# Patient Record
Sex: FEMALE | Race: WHITE | Employment: OTHER | URBAN - METROPOLITAN AREA
[De-identification: names, ages, dates, MRNs, and addresses within clinical notes are randomized per-mention and may not be internally consistent; named-entity substitution may affect disease eponyms.]

---

## 2017-05-18 ENCOUNTER — OFFICE VISIT (OUTPATIENT)
Dept: CARDIOLOGY CLINIC | Age: 62
End: 2017-05-18

## 2017-05-18 ENCOUNTER — TELEPHONE (OUTPATIENT)
Dept: CARDIOLOGY CLINIC | Age: 62
End: 2017-05-18

## 2017-05-18 ENCOUNTER — CLINICAL SUPPORT (OUTPATIENT)
Dept: CARDIOLOGY CLINIC | Age: 62
End: 2017-05-18

## 2017-05-18 VITALS
HEART RATE: 93 BPM | SYSTOLIC BLOOD PRESSURE: 118 MMHG | BODY MASS INDEX: 17.23 KG/M2 | RESPIRATION RATE: 12 BRPM | HEIGHT: 66 IN | WEIGHT: 107.2 LBS | OXYGEN SATURATION: 97 % | DIASTOLIC BLOOD PRESSURE: 82 MMHG

## 2017-05-18 DIAGNOSIS — R09.89 RIGHT CAROTID BRUIT: ICD-10-CM

## 2017-05-18 DIAGNOSIS — R93.1 AGATSTON CORONARY ARTERY CALCIUM SCORE GREATER THAN 400: Primary | ICD-10-CM

## 2017-05-18 DIAGNOSIS — E78.2 MIXED HYPERLIPIDEMIA: ICD-10-CM

## 2017-05-18 DIAGNOSIS — Z72.0 TOBACCO ABUSE: ICD-10-CM

## 2017-05-18 NOTE — MR AVS SNAPSHOT
Visit Information Date & Time Provider Department Dept. Phone Encounter #  
 5/18/2017 10:20 AM Elkin Baez MD [facility] [phone] [id number] Follow-up Instructions Return in about 1 year (around 5/18/2018). Upcoming Health Maintenance Date Due Hepatitis C Screening 1955 Pneumococcal 19-64 Medium Risk (1 of 1 - PPSV23) 2/6/1973 DTaP/Tdap/Td series (1 - Tdap) 2/6/1975 PAP AKA CERVICAL CYTOLOGY 2/6/1975 FOBT Q 1 YEAR AGE 50-75 2/6/2004 ZOSTER VACCINE AGE 60> 2/6/2014 INFLUENZA AGE 9 TO ADULT 8/1/2017 BREAST CANCER SCRN MAMMOGRAM 6/16/2018 Allergies as of 5/18/2017  Review Complete On: 5/18/2017 By: Elkin Baez MD  
  
 Severity Noted Reaction Type Reactions Augmentin [Amoxicillin-pot Clavulanate]  06/15/2015    Nausea and Vomiting Current Immunizations  Never Reviewed No immunizations on file. Not reviewed this visit You Were Diagnosed With   
  
 Codes Comments Mixed hyperlipidemia    -  Primary ICD-10-CM: A83.5 ICD-9-CM: 272.2 Tobacco abuse     ICD-10-CM: Z72.0 ICD-9-CM: 305.1 Agatston coronary artery calcium score greater than 400     ICD-10-CM: R93.1 ICD-9-CM: 793.2 Vitals BP Pulse Resp Height(growth percentile) Weight(growth percentile) SpO2  
 118/82 93 12 5' 6\" (1.676 m) 107 lb 3.2 oz (48.6 kg) 97% BMI Smoking Status 17.3 kg/m2 Current Every Day Smoker Vitals History BMI and BSA Data Body Mass Index Body Surface Area  
 17.3 kg/m 2 1.5 m 2 Preferred Pharmacy Pharmacy Name Phone [address] [facility], [address] [place] [address] Your Updated Medication List  
  
   
This list is accurate as of: 5/18/17 11:13 AM.  Always use your most recent med list. ALLEGRA 60 mg tablet Generic drug:  fexofenadine Take  by mouth as needed. aspirin delayed-release 81 mg tablet Take  by mouth daily. LIPITOR 20 mg tablet Generic drug:  atorvastatin Take  by mouth daily. PREMARIN 0.625 mg tablet Generic drug:  conjugated estrogens Take  by mouth. Follow-up Instructions Return in about 1 year (around 5/18/2018). To-Do List   
 06/19/2017 8:00 AM  
  Appointment with [facility] at [address] [place] [address] ([phone]) Shower or bathe using soap and water. Do not use deodorant, powder, perfumes, or lotion the day of your exam.  If your prior mammograms were not performed at [facility] please bring films with you or forward prior images 2 days before your procedure. Check in at registration 15min before your appointment time unless you were instructed to do otherwise. A script is not necessary, but if you have one, please bring it on the day of the mammogram or have it faxed to the department. [facility] [phone] [facility]  [phone] [facility]  [phone] [facility] [phone] [address] [phone] [facility] [place] [facility]! Dear Comfort Valentine: Thank you for requesting a MYOMO account. Our records indicate that you have previously registered for a MYOMO account but its currently inactive. Please call our MYOMO support line at [phone]. Additional Information If you have questions, please visit the Frequently Asked Questions section of the MYOMO website at [email or web address] Oncimmune/Hyper Urban Level User Swedent/. Remember, MYOMO is NOT to be used for urgent needs. For medical emergencies, dial 911. Now available from your iPhone and Android! Please provide this summary of care documentation to your next provider. Your primary care clinician is listed as Nando Jolly. If you have any questions after today's visit, please call [phone].

## 2017-05-18 NOTE — PROGRESS NOTES
HISTORY OF PRESENT ILLNESS  Tony Denney is a 60 y.o. female     SUMMARY:   Problem List  Date Reviewed: 5/18/2017          Codes Class Noted    Hyperlipidemia ICD-10-CM: E78.5  ICD-9-CM: 272.4  6/15/2015        Tobacco abuse ICD-10-CM: Z72.0  ICD-9-CM: 305.1  6/15/2015        Agatston coronary artery calcium score greater than 400 ICD-10-CM: R93.1  ICD-9-CM: 793.2  6/15/2015    Overview Addendum 6/17/2015  3:15 PM by Stanford Snellen, MD     10/14 normal exercise cardiolyte stress test at [facility], lvef 62%  9/14 calcium score 471, 98th percentile,  intercardia                   Current Outpatient Prescriptions on File Prior to Visit   Medication Sig    conjugated estrogens (PREMARIN) 0.625 mg tablet Take  by mouth.  atorvastatin (LIPITOR) 20 mg tablet Take  by mouth daily.  aspirin delayed-release 81 mg tablet Take  by mouth daily.  fexofenadine (ALLEGRA) 60 mg tablet Take  by mouth as needed. No current facility-administered medications on file prior to visit. CARDIOLOGY STUDIES TO DATE:  6/14 calcium score 471  9/14 normal nuclear stress test per patient    5/17 normal stress echo      Chief Complaint   Patient presents with    Abnormal Cardiac Test     HPI :  Ms. Aye Whitfield continues to do well and remains asymptomatic from a cardiac standpoint. Unfortunately, she is still smoking. She said that Dr. Sweta Cook got some recent blood work on her and her cholesterol was good. She is very active working in her yard and exercising with no difficulty. CARDIAC ROS:   negative for chest pain, dyspnea, palpitations, syncope, orthopnea, paroxysmal nocturnal dyspnea, exertional chest pressure/discomfort, claudication, lower extremity edema    Family History   Problem Relation Age of Onset    Cancer Mother     Heart Disease Father      cabg late 63's       No past medical history on file.     GENERAL ROS:  A comprehensive review of systems was negative except for that written in the HPI.    Visit Vitals    /82    Pulse 93    Resp 12    Ht 5' 6\" (1.676 m)    Wt 107 lb 3.2 oz (48.6 kg)    SpO2 97%    BMI 17.3 kg/m2       Wt Readings from Last 3 Encounters:   05/18/17 107 lb 3.2 oz (48.6 kg)   05/19/16 106 lb 3.2 oz (48.2 kg)   06/15/15 107 lb 6.4 oz (48.7 kg)            BP Readings from Last 3 Encounters:   05/18/17 118/82   05/19/16 140/88   06/15/15 132/80       PHYSICAL EXAM  General appearance: alert, cooperative, no distress, appears stated age  Neck: supple, symmetrical, trachea midline, no adenopathy, right carotid bruit and no JVD  Lungs: clear to auscultation bilaterally  Heart: regular rate and rhythm, S1, S2 normal, no murmur, click, rub or gallop  Extremities: extremities normal, atraumatic, no cyanosis or edema      ASSESSMENT  Ms. Padmini Cabrera stress echo was normal today. We went over all of those results. She has a newly noted right carotid bruit and I explained the implications of that to her. We are going to get carotid Dopplers and track down her most recent lipid profile. current treatment plan is effective, no change in therapy  lab results and schedule of future lab studies reviewed with patient  reviewed diet, exercise and weight control  very strongly urged to quit smoking to reduce cardiovascular risk    Encounter Diagnoses   Name Primary?  Agatston coronary artery calcium score greater than 400 Yes    Mixed hyperlipidemia     Tobacco abuse     Right carotid bruit      No orders of the defined types were placed in this encounter. Follow-up Disposition:  Return in about 1 year (around 5/18/2018).     Elkin Baez MD  5/18/2017

## 2017-05-23 ENCOUNTER — TELEPHONE (OUTPATIENT)
Dept: CARDIOLOGY CLINIC | Age: 62
End: 2017-05-23

## 2017-05-23 ENCOUNTER — CLINICAL SUPPORT (OUTPATIENT)
Dept: CARDIOLOGY CLINIC | Age: 62
End: 2017-05-23

## 2017-05-23 DIAGNOSIS — I65.23 BILATERAL CAROTID ARTERY STENOSIS: ICD-10-CM

## 2017-05-23 DIAGNOSIS — R09.89 RIGHT CAROTID BRUIT: Primary | ICD-10-CM

## 2017-05-23 NOTE — PROGRESS NOTES
Mild to mod blockages on right, mod blockages on left.  May Love for now but need to follow and would repeat in 1yr

## 2017-05-23 NOTE — TELEPHONE ENCOUNTER
Called patient. Verified patient's identity with two identifiers. Notified patient of results and Dr. Kari Prieto message. Discussed results further. Patient requested to have carotids repeated sooner than 1 year. I told her it may be fine to repeat in 6 months. Scheduled carotids and f/u for November. Patient verbalizes understanding and denies further questions or concerns.

## 2017-05-23 NOTE — TELEPHONE ENCOUNTER
Bridger Tate MD   You 9 minutes ago (12:52 PM)                 Should be ok to repeat in 6mos (Routing comment)

## 2017-05-23 NOTE — TELEPHONE ENCOUNTER
----- Message from Onesimo Mendez MD sent at 5/23/2017 10:37 AM EDT -----  Mild to mod blockages on right, mod blockages on left.  [id number] for now but need to follow and would repeat in 1yr

## 2017-05-23 NOTE — PROCEDURES
[facility]  *** FINAL REPORT ***    Name: Lui Vanessa  MRN: [id number]      Outpatient  : 1955  HIS Order #: [id number]  [id number] [facility] [address] Visit #: [id number]  Date: 23 May 2017    TYPE OF TEST: Cerebrovascular Duplex    REASON FOR TEST  Carotid bruit (right hemisphere)    Right Carotid:-             Proximal               Mid                 Distal  cm/s  Systolic  Diastolic  Systolic  Diastolic  Systolic  Diastolic  CCA:     71.0      16.0                            89.0      31.0  Bulb:    87.0      28.0  ECA:     99.0      18.0  ICA:     84.0      26.0       91.0      34.0      100.0      38.0  ICA/CCA:  0.9       0.8    ICA Stenosis: Unknown    Right Vertebral:-  Finding: Antegrade  Sys:       43.0  Aicha:       10.0    Right Subclavian:    Left Carotid:-            Proximal                Mid                 Distal  cm/s  Systolic  Diastolic  Systolic  Diastolic  Systolic  Diastolic  CCA:     89.2      20.0                            65.0      21.0  Bulb:   120.0      44.0  ECA:    106.0      20.0  ICA:    115.0      38.0      155.0      59.0      128.0      40.0  ICA/CCA:  1.8       1.8    ICA Stenosis: Unknown    Left Vertebral:-  Finding: Antegrade  Sys:       55.0  Aicha:       21.0    Left Subclavian:    INTERPRETATION/FINDINGS  PROCEDURE:  Evaluation of the extracranial cerebrovascular arteries  with ultrasound (B-mode imaging, pulsed Doppler, color Doppler). Includes the common carotid, internal carotid, external carotid, and  vertebral arteries. FINDINGS:    Right:  Mild heterogeneous plaque is exhibited within the  bifurcation and the proximal internal carotid artery. Color flow  imaging reveals a mild filling defect. Peak ICA velocities are normal   at 100 cm/s. LeftL  Mild, partially calcified plaque is visualized at the level  of the bifurcation extending into the proximal internal carotid  artery.   Additional plaque is seen within the mid segment of the  internal carotid artery. Filling defects are noted at the site of the   plaque formation. Peak systolic velocities are mildly elevated at 155   cm/s. IMPRESSION: Findings are consistent with 10-49% stenosis of the right  internal carotid and 50-79% stenosis of the left internal carotid. Vertebrals are patent with antegrade flow. ADDITIONAL COMMENTS    I have personally reviewed the data relevant to the interpretation of  this  study. TECHNOLOGIST: DANIEL Flaherty  Signed: 05/23/2017 08:42 AM    PHYSICIAN: Lesley Solano.  Jacy Jones MD  Signed: 05/23/2017 09:11 AM

## 2017-06-19 ENCOUNTER — HOSPITAL ENCOUNTER (OUTPATIENT)
Dept: MAMMOGRAPHY | Age: 62
Discharge: HOME | End: 2017-06-19
Attending: FAMILY MEDICINE
Payer: COMMERCIAL

## 2017-06-19 DIAGNOSIS — Z12.31 VISIT FOR SCREENING MAMMOGRAM: ICD-10-CM

## 2017-06-19 PROCEDURE — 77067 SCR MAMMO BI INCL CAD: CPT

## 2017-11-16 ENCOUNTER — TELEPHONE (OUTPATIENT)
Dept: CARDIOLOGY CLINIC | Age: 62
End: 2017-11-16

## 2017-11-16 ENCOUNTER — CLINICAL SUPPORT (OUTPATIENT)
Dept: CARDIOLOGY CLINIC | Age: 62
End: 2017-11-16

## 2017-11-16 DIAGNOSIS — R09.89 RIGHT CAROTID BRUIT: Primary | ICD-10-CM

## 2017-11-16 DIAGNOSIS — I65.23 BILATERAL CAROTID ARTERY STENOSIS: ICD-10-CM

## 2017-11-16 NOTE — PROCEDURES
[facility]  *** FINAL REPORT ***    Name: Francisca Levy  MRN: [id number]      Outpatient  : 1955  HIS Order #: [id number]  [id number] [facility] Visit #: [id number]  Date: 2017    TYPE OF TEST: Cerebrovascular Duplex    REASON FOR TEST  Carotid bruit (right hemisphere), Known carotid stenosis    Right Carotid:-             Proximal               Mid                 Distal  cm/s  Systolic  Diastolic  Systolic  Diastolic  Systolic  Diastolic  CCA:     69.6      21.0                            87.0      32.0  Bulb:  ECA:    158.0      30.0  ICA:     82.0      24.0       99.0      36.0       82.0      31.0  ICA/CCA:  0.9       0.8    ICA Stenosis: <50%    Right Vertebral:-  Finding: Antegrade  Sys:       42.0  Aicha:    Right Subclavian:    Left Carotid:-            Proximal                Mid                 Distal  cm/s  Systolic  Diastolic  Systolic  Diastolic  Systolic  Diastolic  CCA:    489.9      32.0                            86.0      27.0  Bulb:  ECA:    104.0      29.0  ICA:    139.0      46.0      101.0      36.0       86.0      37.0  ICA/CCA:  1.6       1.7    ICA Stenosis: 50-69%    Left Vertebral:-  Finding: Antegrade  Sys:       54.0  Aicha:    Left Subclavian:    INTERPRETATION/FINDINGS  PROCEDURE:  Evaluation of the extracranial cerebrovascular arteries  with ultrasound (B-mode imaging, pulsed Doppler, color Doppler). Includes the common carotid, internal carotid, external carotid, and  vertebral arteries. FINDINGS:  Right side - Intimal thickening is noted within the distal common  carotid artery. Mild heterogeneous plaque is present within the  proximal internal and external carotid arteries. Mild color flow  filling defects are noted and peak velocities remain within normal  limits. Left side - Moderate, partially calcified plaque extends through the  proximal internal carotid artery. Color flow exhibits mild to  moderate filling defects.   Peak ICA velocities are elevated. IMPRESSION:  1. 10-49% stenosis in the right internal carotid rtery. 2. 50-79% stenosis in the left internal carotid artery. 3. Vertebral artery flow is antegrade bilaterally. 4. No significant change when compared to the previous exam of  5/23/2017. ADDITIONAL COMMENTS    I have personally reviewed the data relevant to the interpretation of  this  study. TECHNOLOGIST: Bonnie Gifford RVT, RDMS, RDCS  Signed: 11/16/2017 09:50 AM    PHYSICIAN: Larisa Pisano.  Jena Salguero MD  Signed: 11/21/2017 01:08 PM

## 2017-11-16 NOTE — TELEPHONE ENCOUNTER
When I called patient in May with carotid results, she had stated she wanted to have the testing sooner than 1 year f/u so I had rescheduled appointments for carotids and follow up for today (6 months). Patient had carotid dopplers today. Patient states she did not expect office visit and has her dog in her car. Dr. Iris Gonzalez said she did not need to be seen by him today. I told patient I would call her with results and will make future follow up appointment. Patient verbalized understanding and denies further questions or concerns.

## 2017-11-17 NOTE — TELEPHONE ENCOUNTER
Heather Bueno MD   You 5 minutes ago (1:17 PM)                 Moderate left and mild to mod right sided blockage. No change from before. Repeat 1yr (Routing comment)           Called patient. Left message on voicemail. Will give results.

## 2018-05-14 ENCOUNTER — OFFICE VISIT (OUTPATIENT)
Dept: CARDIOLOGY CLINIC | Age: 63
End: 2018-05-14

## 2018-05-14 VITALS
BODY MASS INDEX: 17 KG/M2 | HEART RATE: 85 BPM | DIASTOLIC BLOOD PRESSURE: 86 MMHG | OXYGEN SATURATION: 98 % | WEIGHT: 105.8 LBS | SYSTOLIC BLOOD PRESSURE: 138 MMHG | HEIGHT: 66 IN

## 2018-05-14 DIAGNOSIS — I65.23 BILATERAL CAROTID ARTERY STENOSIS: ICD-10-CM

## 2018-05-14 DIAGNOSIS — R93.1 AGATSTON CORONARY ARTERY CALCIUM SCORE GREATER THAN 400: Primary | ICD-10-CM

## 2018-05-14 DIAGNOSIS — E78.2 MIXED HYPERLIPIDEMIA: ICD-10-CM

## 2018-05-14 NOTE — PROGRESS NOTES
HISTORY OF PRESENT ILLNESS  Danielle Thomason is a 58 y.o. female     SUMMARY:   Problem List  Date Reviewed: 5/14/2018          Codes Class Noted    Hyperlipidemia ICD-10-CM: E78.5  ICD-9-CM: 272.4  6/15/2015        Tobacco abuse ICD-10-CM: Z72.0  ICD-9-CM: 305.1  6/15/2015        [facility] coronary artery calcium score greater than 400 ICD-10-CM: R93.1  ICD-9-CM: 793.2  6/15/2015    Overview Addendum 6/17/2015  3:15 PM by Julia Barber MD     10/14 normal exercise cardiolyte stress test at [facility], lvef 62%  9/14 calcium score 471, 98th percentile,  intercardia                   Current Outpatient Prescriptions on File Prior to Visit   Medication Sig    conjugated estrogens (PREMARIN) 0.625 mg tablet Take  by mouth.  atorvastatin (LIPITOR) 20 mg tablet Take  by mouth daily.  aspirin delayed-release 81 mg tablet Take  by mouth daily.  fexofenadine (ALLEGRA) 60 mg tablet Take  by mouth as needed. No current facility-administered medications on file prior to visit. CARDIOLOGY STUDIES TO DATE:  6/14 calcium score 471  9/14 normal nuclear stress test per patient    5/17 normal stress echo  11/17 50-79% left  and 10-49% right carotid stenoses    Chief Complaint   Patient presents with    Abnormal Cardiac Test     HPI :  Ms. Nik Sin is doing great. They have been back and forth to [place] a few times and they have been working on their house here in [place], so she is kind of worn out with all of that. Dr. Martha Anton is following her lipids. Unfortunately, she is still smoking. CARDIAC ROS:   negative for chest pain, dyspnea, palpitations, syncope, orthopnea, paroxysmal nocturnal dyspnea, exertional chest pressure/discomfort, claudication, lower extremity edema    Family History   Problem Relation Age of Onset    Cancer Mother     Heart Disease Father      cabg late 63's       No past medical history on file.     GENERAL ROS:  A comprehensive review of systems was negative except for that written in the HPI. Visit Vitals    /86 (BP 1 Location: Left arm, BP Patient Position: Sitting)    Pulse 85    Ht 5' 6\" (1.676 m)    Wt 105 lb 12.8 oz (48 kg)    SpO2 98%    BMI 17.08 kg/m2       Wt Readings from Last 3 Encounters:   05/14/18 105 lb 12.8 oz (48 kg)   05/18/17 107 lb 3.2 oz (48.6 kg)   05/19/16 106 lb 3.2 oz (48.2 kg)            BP Readings from Last 3 Encounters:   05/14/18 138/86   05/18/17 118/82   05/19/16 140/88       PHYSICAL EXAM  General appearance: alert, cooperative, no distress, appears stated age  Neck: supple, symmetrical, trachea midline, no adenopathy, no carotid bruit and no JVD  Lungs: clear to auscultation bilaterally  Heart: regular rate and rhythm, S1, S2 normal, no murmur, click, rub or gallop  Extremities: extremities normal, atraumatic, no cyanosis or edema      ASSESSMENT  Ms. Ruy Montenegro is stable and asymptomatic and well compensated at this point on a reasoanble medical regimen. We will track down her most recent lipid results. She will need carotid Dopplers when she returns for followup. current treatment plan is effective, no change in therapy  lab results and schedule of future lab studies reviewed with patient  reviewed diet, exercise and weight control    Encounter Diagnoses   Name Primary?  Agatston coronary artery calcium score greater than 400 Yes    Mixed hyperlipidemia     Bilateral carotid artery stenosis      No orders of the defined types were placed in this encounter. Follow-up Disposition:  Return in about 6 months (around 11/14/2018).     Tonya Sanz MD  5/14/2018

## 2018-05-14 NOTE — MR AVS SNAPSHOT
[address] [facility] [address] [place] 
[phone] Patient: Nick Ferris MRN: [id number] Visit Information Date & Time Provider Department Dept. Phone Encounter #  
 2018  9:20 AM Yoshi Rae MD [facility] Luo [phone] [id number] Follow-up Instructions Return in about 1 year (around 2019). Your Appointments 2018  9:20 AM  
ESTABLISHED PATIENT with Yoshi Rae MD  
[facility] ([address] [facility]) Appt Note: 6 month  
 [address] [facility] [address] [place]  
[address] [facility] [address] [phone] Upcoming Health Maintenance Date Due Hepatitis C Screening 1955 Pneumococcal 19-64 Medium Risk (1 of 1 - PPSV23) 1973 DTaP/Tdap/Td series (1 - Tdap) 1975 PAP AKA CERVICAL CYTOLOGY 1975 ZOSTER VACCINE AGE 60> 2013 FOBT Q 1 YEAR AGE 50-75 2018 Influenza Age 5 to Adult 2018 BREAST CANCER SCRN MAMMOGRAM 2019 Allergies as of 2018  Review Complete On: 2018 By: Yoshi Rae MD  
  
 Severity Noted Reaction Type Reactions Augmentin [Amoxicillin-pot Clavulanate]  06/15/2015    Nausea and Vomiting Current Immunizations  Never Reviewed No immunizations on file. Not reviewed this visit You Were Diagnosed With   
  
 Codes Comments Agatston coronary artery calcium score greater than 400    -  Primary ICD-10-CM: R93.1 ICD-9-CM: 793.2 Mixed hyperlipidemia     ICD-10-CM: E78.2 ICD-9-CM: 272.2 Bilateral carotid artery stenosis     ICD-10-CM: I65.23 ICD-9-CM: 433.10, 433.30 Vitals BP Pulse Height(growth percentile) Weight(growth percentile) SpO2 BMI  
 138/86 (BP 1 Location: Left arm, BP Patient Position: Sitting) 85 5' 6\" (1.676 m) 105 lb 12.8 oz (48 kg) 98% 17.08 kg/m2 Smoking Status Current Every Day Smoker BMI and BSA Data Body Mass Index Body Surface Area 17.08 kg/m 2 1.5 m 2 Your Updated Medication List  
  
   
This list is accurate as of 5/14/18  9:19 AM.  Always use your most recent med list. ALLEGRA 60 mg tablet Generic drug:  fexofenadine Take  by mouth as needed. aspirin delayed-release 81 mg tablet Take  by mouth daily. LIPITOR 20 mg tablet Generic drug:  atorvastatin Take  by mouth daily. PREMARIN 0.625 mg tablet Generic drug:  conjugated estrogens Take  by mouth. Follow-up Instructions Return in about 1 year (around 5/14/2019). Introducing [facility]! Dear Puneet Gudino: Thank you for requesting a Clean TeQ account. Our records indicate that you have previously registered for a Clean TeQ account but its currently inactive. Please call our Clean TeQ support line at [phone]. Additional Information If you have questions, please visit the Frequently Asked Questions section of the Clean TeQ website at [email or web address] Remember, Clean TeQ is NOT to be used for urgent needs. For medical emergencies, dial 911. Now available from your iPhone and Android! Please provide this summary of care documentation to your next provider. Your primary care clinician is listed as Jaelyn Hi. If you have any questions after today's visit, please call [phone].

## 2018-06-20 ENCOUNTER — HOSPITAL ENCOUNTER (OUTPATIENT)
Dept: MAMMOGRAPHY | Age: 63
Discharge: HOME | End: 2018-06-20
Attending: FAMILY MEDICINE
Payer: COMMERCIAL

## 2018-06-20 DIAGNOSIS — Z12.31 VISIT FOR SCREENING MAMMOGRAM: ICD-10-CM

## 2018-06-20 PROCEDURE — 77063 BREAST TOMOSYNTHESIS BI: CPT

## 2018-11-12 ENCOUNTER — OFFICE VISIT (OUTPATIENT)
Dept: CARDIOLOGY CLINIC | Age: 63
End: 2018-11-12

## 2018-11-12 ENCOUNTER — CLINICAL SUPPORT (OUTPATIENT)
Dept: CARDIOLOGY CLINIC | Age: 63
End: 2018-11-12

## 2018-11-12 VITALS
SYSTOLIC BLOOD PRESSURE: 100 MMHG | DIASTOLIC BLOOD PRESSURE: 64 MMHG | BODY MASS INDEX: 17.68 KG/M2 | HEART RATE: 76 BPM | HEIGHT: 66 IN | WEIGHT: 110 LBS | OXYGEN SATURATION: 97 % | RESPIRATION RATE: 18 BRPM

## 2018-11-12 DIAGNOSIS — Z72.0 TOBACCO ABUSE: ICD-10-CM

## 2018-11-12 DIAGNOSIS — I65.23 BILATERAL CAROTID ARTERY STENOSIS: Primary | ICD-10-CM

## 2018-11-12 DIAGNOSIS — R93.1 AGATSTON CORONARY ARTERY CALCIUM SCORE GREATER THAN 400: Primary | ICD-10-CM

## 2018-11-12 DIAGNOSIS — E78.2 MIXED HYPERLIPIDEMIA: ICD-10-CM

## 2018-11-12 NOTE — PROCEDURES
[facility]  *** FINAL REPORT ***    Name: Tita Newby  MRN: [id number]      Outpatient  : 1955  HIS Order #: [id number]  [id number] [facility] Visit #: [id number]  Date: 2018    TYPE OF TEST: Cerebrovascular Duplex    REASON FOR TEST  Known carotid stenosis    Right Carotid:-             Proximal               Mid                 Distal  cm/s  Systolic  Diastolic  Systolic  Diastolic  Systolic  Diastolic  CCA:     40.2      23.0                            91.0      22.0  Bulb:  ECA:    106.0      19.0  ICA:     77.0      23.0       91.0      30.0       78.0      30.0  ICA/CCA:  0.8       1.0    ICA Stenosis: <50%    Right Vertebral:-  Finding: Antegrade  Sys:       32.0  Aicha:        9.0    Right Subclavian:    Left Carotid:-            Proximal                Mid                 Distal  cm/s  Systolic  Diastolic  Systolic  Diastolic  Systolic  Diastolic  CCA:     87.1      22.0                            68.0      29.0  Bulb:  ECA:     10.4      15.0  ICA:    133.0      45.0      129.0      34.0      107.0      31.0  ICA/CCA:  2.0       1.6    ICA Stenosis: 50-69%    Left Vertebral:-  Finding: Antegrade  Sys:       48.0  Aicha:       15.0    Left Subclavian:    INTERPRETATION/FINDINGS  PROCEDURE:  Evaluation of the extracranial cerebrovascular arteries  with ultrasound (Grayscale imaging, pulsed Doppler color Doppler). Includes the common carotid, internal carotid, external carotid and  vertebral arteries. FINDINGS:  Right side - Mild heterogeneous plaque is present within the proximal  internal carotid artery bilaterally. Color flow exhibits mild filling   defects and peak velocities are within normal limits. Left side - Moderate calcified plaque extends through the proximal  internal carotid artery. There is narrowing of the color flow channel   and peak velocities are mildly elevated. IMPRESSION:  1)  10-49% stenosis of the right internal carotid artery.   2)  50-79% stenosis of the left internal carotid artery. 3)  Vertebral arteries are patent with antegrade flow. 4)  There has been no interval change since the previous exam of  11/16/2017. ADDITIONAL COMMENTS    I have personally reviewed the data relevant to the interpretation of  this  study. TECHNOLOGIST: Harinder Paul RVT, RDMS, RDCS  Signed: 11/12/2018 08:39 AM    PHYSICIAN: Emelda Mohs.  Tanya Whitehead MD  Signed: 11/13/2018 04:45 PM

## 2018-11-12 NOTE — PROGRESS NOTES
HISTORY OF PRESENT ILLNESS  Milly Looney is a 58 y.o. female     SUMMARY:   Problem List  Date Reviewed: 11/12/2018          Codes Class Noted    Hyperlipidemia ICD-10-CM: E78.5  ICD-9-CM: 272.4  6/15/2015        Tobacco abuse ICD-10-CM: Z72.0  ICD-9-CM: 305.1  6/15/2015        Agatston coronary artery calcium score greater than 400 ICD-10-CM: R93.1  ICD-9-CM: 793.2  6/15/2015    Overview Addendum 6/17/2015  3:15 PM by Ponce Bueno MD     10/14 normal exercise cardiolyte stress test at [facility], lvef 62%  9/14 calcium score 471, 98th percentile,  intercardia                   Current Outpatient Medications on File Prior to Visit   Medication Sig    conjugated estrogens (PREMARIN) 0.625 mg tablet Take  by mouth.  atorvastatin (LIPITOR) 20 mg tablet Take  by mouth daily.  aspirin delayed-release 81 mg tablet Take  by mouth daily.  fexofenadine (ALLEGRA) 60 mg tablet Take  by mouth as needed. No current facility-administered medications on file prior to visit. CARDIOLOGY STUDIES TO DATE:  6/14 calcium score 471, 98th percentile  9/14 normal nuclear stress test per patient    5/17 normal stress echo  11/17 50-79% left  and 10-49% right carotid stenoses  11/18 carotid dopplers unchanged      Chief Complaint   Patient presents with    Abnormal Cardiac Test     HPI :  Ms. Isacc Dumas is doing great. She has not been walking as much as she had in the past, mainly because of the weather and she has noticed some pain in her calves and thighs, especially on hills. No other cardiac type symptoms. Dr. Susie Elizabeth is following her lipids. Unfortunately, she is still smoking. Carotid Dopplers showed stable disease.           CARDIAC ROS:   negative for chest pain, dyspnea, palpitations, syncope, orthopnea, paroxysmal nocturnal dyspnea, exertional chest pressure/discomfort    Family History   Problem Relation Age of Onset    Cancer Mother     Heart Disease Father         cabg late 63's       No past medical history on file. GENERAL ROS:  A comprehensive review of systems was negative except for that written in the HPI. Visit Vitals  /64 (BP 1 Location: Left arm, BP Patient Position: Sitting)   Pulse 76   Resp 18   Ht 5' 6\" (1.676 m)   Wt 110 lb (49.9 kg)   SpO2 97%   BMI 17.75 kg/m²       Wt Readings from Last 3 Encounters:   11/12/18 110 lb (49.9 kg)   05/14/18 105 lb 12.8 oz (48 kg)   05/18/17 107 lb 3.2 oz (48.6 kg)            BP Readings from Last 3 Encounters:   11/12/18 100/64   05/14/18 138/86   05/18/17 118/82       PHYSICAL EXAM  General appearance: alert, cooperative, no distress, appears stated age  Neurologic: Alert and oriented X 3  Neck: supple, symmetrical, trachea midline, no adenopathy, no carotid bruit and no JVD  Lungs: clear to auscultation bilaterally  Heart: regular rate and rhythm, S1, S2 normal, no murmur, click, rub or gallop  Extremities: extremities normal, atraumatic, no cyanosis or edema      ASSESSMENT  Ms. Wesley Singh is stable and asymptomatic from a cardiac perspective on a good medical regimen. We are going to do lower extremity arterial Dopplers and see if there are any vascular issues. It may turn out to be musculoskeletal.             current treatment plan is effective, no change in therapy  lab results and schedule of future lab studies reviewed with patient  reviewed diet, exercise and weight control  very strongly urged to quit smoking to reduce cardiovascular risk    Encounter Diagnoses   Name Primary?  Agatston coronary artery calcium score greater than 400 Yes    Mixed hyperlipidemia     Tobacco abuse      No orders of the defined types were placed in this encounter. Follow-up Disposition:  Return in about 6 months (around 5/12/2019).     Aguilar Stacy MD  11/12/2018

## 2018-11-13 ENCOUNTER — TELEPHONE (OUTPATIENT)
Dept: CARDIOLOGY CLINIC | Age: 63
End: 2018-11-13

## 2018-11-13 ENCOUNTER — CLINICAL SUPPORT (OUTPATIENT)
Dept: CARDIOLOGY CLINIC | Age: 63
End: 2018-11-13

## 2018-11-13 DIAGNOSIS — I73.9 CLAUDICATION (HCC): Primary | ICD-10-CM

## 2018-11-13 DIAGNOSIS — Z72.0 TOBACCO ABUSE: ICD-10-CM

## 2018-11-13 DIAGNOSIS — E78.2 MIXED HYPERLIPIDEMIA: ICD-10-CM

## 2018-11-13 DIAGNOSIS — R93.1 AGATSTON CORONARY ARTERY CALCIUM SCORE GREATER THAN 400: ICD-10-CM

## 2018-11-13 NOTE — PROCEDURES
[facility]  *** FINAL REPORT ***    Name: Marcia Gómez  MRN: [id number]      Outpatient  : 1955  HIS Order #: [id number]  [address] [facility] Visit #: [id number]  Date: 2018    TYPE OF TEST: Peripheral Arterial Testing    REASON FOR TEST  Claudication (both sides)    Right Leg  Segmentals: Normal                     mmHg  Brachial         166  High thigh       192  Low thigh        203  Calf             186  Posterior tibial 187  Dorsalis pedis   191  Peroneal  Metatarsal  Toe pressure  Doppler:    Normal  PVR:        Normal  Ankle/Brachial: 1.15    Left Leg  Segmentals: Normal                     mmHg  Brachial         163  High thigh       182  Low thigh        225  Calf             207  Posterior tibial 176  Dorsalis pedis   185  Peroneal  Metatarsal  Toe pressure  Doppler:    Normal  PVR:        Normal  Ankle/Brachial: 1.11  Post exercise results:  Speed:  mph  Grade:  Duration: 5     Brachial  Right Ankle  JUSTIN    Left Ankle  JUSTIN    1:               193                193  2:  3:  4:  5:    INTERPRETATION/FINDINGS  PROCEDURE:  Multi-level lower extemity arterial segmental pressures,  pulse volume recordings and CW Doppler wavefoms. Findings:  Doppler waveforms are multiphasic at all levels  bilaterally. Pulse volume recordings are normal in configuration. There are no significant pressure gradients. Resting ankle/brachial  indices are within normal limits. Post exercise, there was a rise in  bilateral ankle pressure. IMPRESSION:  No evidence of significant arterial occlusive disease in  either lower extremity at rest or post exercise. ADDITIONAL COMMENTS    I have personally reviewed the data relevant to the interpretation of  this  study. TECHNOLOGIST: Nato Duncan RVT, RDMS, RDCS  Signed: 2018 11:43 AM    PHYSICIAN: Moon Friday.  Jeny Cox MD  Signed: 2018 04:45 PM

## 2018-11-13 NOTE — TELEPHONE ENCOUNTER
Called patient. Verified patient's identity with two identifiers. Notified patient of results and Dr. Diazed Lines message. Patient verbalized understanding and denied further questions or concerns.

## 2018-11-13 NOTE — TELEPHONE ENCOUNTER
----- Message from Kaya Woods MD sent at 11/13/2018  5:03 PM EST -----  No evidence of any blockages as cause for leg symptoms

## 2019-06-25 ENCOUNTER — HOSPITAL ENCOUNTER (OUTPATIENT)
Dept: MAMMOGRAPHY | Age: 64
Discharge: HOME | End: 2019-06-25
Attending: FAMILY MEDICINE
Payer: MEDICARE

## 2019-06-25 DIAGNOSIS — Z12.39 BREAST SCREENING, UNSPECIFIED: ICD-10-CM

## 2019-06-25 PROCEDURE — 77063 BREAST TOMOSYNTHESIS BI: CPT

## 2019-11-14 ENCOUNTER — OFFICE VISIT (OUTPATIENT)
Dept: CARDIOLOGY CLINIC | Age: 64
End: 2019-11-14

## 2019-11-14 VITALS
WEIGHT: 110 LBS | RESPIRATION RATE: 16 BRPM | SYSTOLIC BLOOD PRESSURE: 122 MMHG | BODY MASS INDEX: 17.68 KG/M2 | OXYGEN SATURATION: 98 % | HEIGHT: 66 IN | DIASTOLIC BLOOD PRESSURE: 82 MMHG | HEART RATE: 87 BPM

## 2019-11-14 DIAGNOSIS — I77.9 CAROTID ARTERY DISEASE WITHOUT CEREBRAL INFARCTION (HCC): ICD-10-CM

## 2019-11-14 DIAGNOSIS — E78.2 MIXED HYPERLIPIDEMIA: Primary | ICD-10-CM

## 2019-11-14 DIAGNOSIS — Z72.0 TOBACCO ABUSE: ICD-10-CM

## 2019-11-14 DIAGNOSIS — R93.1 AGATSTON CORONARY ARTERY CALCIUM SCORE GREATER THAN 400: ICD-10-CM

## 2019-11-14 NOTE — PROGRESS NOTES
HISTORY OF PRESENT ILLNESS  Jenae Cody is a 71 y.o. female     SUMMARY:   Problem List  Date Reviewed: 11/13/2019          Codes Class Noted    Hyperlipidemia ICD-10-CM: E78.5  ICD-9-CM: 272.4  6/15/2015        Tobacco abuse ICD-10-CM: Z72.0  ICD-9-CM: 305.1  6/15/2015        Agatston coronary artery calcium score greater than 400 ICD-10-CM: R93.1  ICD-9-CM: 793.2  6/15/2015    Overview Addendum 6/17/2015  3:15 PM by Faheem Varma MD     10/14 normal exercise cardiolyte stress test at [facility], lvef 62%  9/14 calcium score 471, 98th percentile,  intercardia                   Current Outpatient Medications on File Prior to Visit   Medication Sig    conjugated estrogens (PREMARIN) 0.625 mg tablet Take  by mouth.  atorvastatin (LIPITOR) 20 mg tablet Take  by mouth daily.  fexofenadine (ALLEGRA) 60 mg tablet Take  by mouth as needed.  aspirin delayed-release 81 mg tablet Take  by mouth daily. No current facility-administered medications on file prior to visit. CARDIOLOGY STUDIES TO DATE:  6/14 calcium score 471, 98th percentile  9/14 normal nuclear stress test per patient    5/17 normal stress echo  11/17 50-79% left and 10-49% right carotid stenoses  11/18 carotid dopplers unchanged    Chief Complaint   Patient presents with    Cholesterol Problem     HPI :  Ms. Firman Boas is doing well. She walks four to five miles a day three days a week and the other days does stretching and light resistance exercise with no worrisome symptoms. Primary care is following her lipids. Her carotid Dopplers today showed stable disease. CARDIAC ROS:   negative for chest pain, dyspnea, palpitations, syncope, orthopnea, paroxysmal nocturnal dyspnea, exertional chest pressure/discomfort, claudication, lower extremity edema    Family History   Problem Relation Age of Onset    Cancer Mother     Heart Disease Father         cabg late 63's       No past medical history on file.     GENERAL ROS:  A comprehensive review of systems was negative except for that written in the HPI. Visit Vitals  /82 (BP 1 Location: Left arm, BP Patient Position: Sitting)   Pulse 87   Resp 16   Ht 5' 6\" (1.676 m)   Wt 110 lb (49.9 kg)   SpO2 98%   BMI 17.75 kg/m²       Wt Readings from Last 3 Encounters:   11/14/19 110 lb (49.9 kg)   11/12/18 110 lb (49.9 kg)   05/14/18 105 lb 12.8 oz (48 kg)            BP Readings from Last 3 Encounters:   11/14/19 122/82   11/12/18 100/64   05/14/18 138/86       PHYSICAL EXAM  General appearance: alert, cooperative, no distress, appears stated age  Neurologic: Alert and oriented X 3  Neck: supple, symmetrical, trachea midline, no adenopathy, no carotid bruit and no JVD  Lungs: clear to auscultation bilaterally  Heart: regular rate and rhythm, S1, S2 normal, no murmur, click, rub or gallop  Extremities: extremities normal, atraumatic, no cyanosis or edema      ASSESSMENT  Ms. Peter Acosta is stable and asymptomatic, well compensated on a good medical regimen and needs no cardiac testing at this time. We will get a copy of her recent lipid profile and repeat her carotid Dopplers in one year. current treatment plan is effective, no change in therapy  lab results and schedule of future lab studies reviewed with patient  reviewed diet, exercise and weight control  very strongly urged to quit smoking to reduce cardiovascular risk    Encounter Diagnoses   Name Primary?  Mixed hyperlipidemia Yes    Agatston coronary artery calcium score greater than 400     Tobacco abuse     Carotid artery disease without cerebral infarction ([facility] [address]      No orders of the defined types were placed in this encounter. Follow-up and Dispositions    · Return in about 1 year (around 11/14/2020).          Brendan Pacheco MD  11/14/2019

## 2019-11-18 ENCOUNTER — ANESTHESIA EVENT (OUTPATIENT)
Dept: ENDOSCOPY | Age: 64
End: 2019-11-18
Payer: MEDICARE

## 2019-11-18 ENCOUNTER — ANESTHESIA (OUTPATIENT)
Dept: ENDOSCOPY | Age: 64
End: 2019-11-18
Payer: MEDICARE

## 2019-11-18 ENCOUNTER — HOSPITAL ENCOUNTER (OUTPATIENT)
Age: 64
Setting detail: OUTPATIENT SURGERY
Discharge: HOME | End: 2019-11-18
Attending: COLON & RECTAL SURGERY | Admitting: COLON & RECTAL SURGERY
Payer: MEDICARE

## 2019-11-18 VITALS
RESPIRATION RATE: 20 BRPM | BODY MASS INDEX: 17.45 KG/M2 | SYSTOLIC BLOOD PRESSURE: 116 MMHG | WEIGHT: 108.13 LBS | TEMPERATURE: 98 F | HEART RATE: 74 BPM | DIASTOLIC BLOOD PRESSURE: 69 MMHG | OXYGEN SATURATION: 98 %

## 2019-11-18 PROCEDURE — 77030013992 HC SNR POLYP ENDOSC BSC -B: Performed by: COLON & RECTAL SURGERY

## 2019-11-18 PROCEDURE — 74011000250 HC RX REV CODE- 250: Performed by: NURSE ANESTHETIST, CERTIFIED REGISTERED

## 2019-11-18 PROCEDURE — 74011250637 HC RX REV CODE- 250/637: Performed by: COLON & RECTAL SURGERY

## 2019-11-18 PROCEDURE — 76060000031 HC ANESTHESIA FIRST 0.5 HR: Performed by: COLON & RECTAL SURGERY

## 2019-11-18 PROCEDURE — 74011250636 HC RX REV CODE- 250/636: Performed by: NURSE ANESTHETIST, CERTIFIED REGISTERED

## 2019-11-18 PROCEDURE — 88305 TISSUE EXAM BY PATHOLOGIST: CPT

## 2019-11-18 PROCEDURE — 74011250636 HC RX REV CODE- 250/636: Performed by: COLON & RECTAL SURGERY

## 2019-11-18 PROCEDURE — 76040000019: Performed by: COLON & RECTAL SURGERY

## 2019-11-18 RX ORDER — PROPOFOL 10 MG/ML
INJECTION, EMULSION INTRAVENOUS AS NEEDED
Status: DISCONTINUED | OUTPATIENT
Start: 2019-11-18 | End: 2019-11-18 | Stop reason: HOSPADM

## 2019-11-18 RX ORDER — SODIUM CHLORIDE 9 MG/ML
50 INJECTION, SOLUTION INTRAVENOUS CONTINUOUS
Status: DISCONTINUED | OUTPATIENT
Start: 2019-11-18 | End: 2019-11-18 | Stop reason: HOSPADM

## 2019-11-18 RX ORDER — ATROPINE SULFATE 0.1 MG/ML
0.5 INJECTION INTRAVENOUS
Status: DISCONTINUED | OUTPATIENT
Start: 2019-11-18 | End: 2019-11-18 | Stop reason: HOSPADM

## 2019-11-18 RX ORDER — SODIUM CHLORIDE 0.9 % (FLUSH) 0.9 %
5-40 SYRINGE (ML) INJECTION EVERY 8 HOURS
Status: DISCONTINUED | OUTPATIENT
Start: 2019-11-18 | End: 2019-11-18 | Stop reason: HOSPADM

## 2019-11-18 RX ORDER — DEXTROMETHORPHAN/PSEUDOEPHED 2.5-7.5/.8
1.2 DROPS ORAL
Status: DISCONTINUED | OUTPATIENT
Start: 2019-11-18 | End: 2019-11-18 | Stop reason: HOSPADM

## 2019-11-18 RX ORDER — FLUMAZENIL 0.1 MG/ML
0.2 INJECTION INTRAVENOUS
Status: DISCONTINUED | OUTPATIENT
Start: 2019-11-18 | End: 2019-11-18 | Stop reason: HOSPADM

## 2019-11-18 RX ORDER — LIDOCAINE HYDROCHLORIDE 20 MG/ML
INJECTION, SOLUTION EPIDURAL; INFILTRATION; INTRACAUDAL; PERINEURAL AS NEEDED
Status: DISCONTINUED | OUTPATIENT
Start: 2019-11-18 | End: 2019-11-18 | Stop reason: HOSPADM

## 2019-11-18 RX ORDER — SODIUM CHLORIDE 9 MG/ML
INJECTION, SOLUTION INTRAVENOUS
Status: DISCONTINUED | OUTPATIENT
Start: 2019-11-18 | End: 2019-11-18 | Stop reason: HOSPADM

## 2019-11-18 RX ORDER — EPINEPHRINE 0.1 MG/ML
1 INJECTION INTRACARDIAC; INTRAVENOUS
Status: DISCONTINUED | OUTPATIENT
Start: 2019-11-18 | End: 2019-11-18 | Stop reason: HOSPADM

## 2019-11-18 RX ORDER — SODIUM CHLORIDE 0.9 % (FLUSH) 0.9 %
5-40 SYRINGE (ML) INJECTION AS NEEDED
Status: DISCONTINUED | OUTPATIENT
Start: 2019-11-18 | End: 2019-11-18 | Stop reason: HOSPADM

## 2019-11-18 RX ORDER — NALOXONE HYDROCHLORIDE 0.4 MG/ML
0.4 INJECTION, SOLUTION INTRAMUSCULAR; INTRAVENOUS; SUBCUTANEOUS
Status: DISCONTINUED | OUTPATIENT
Start: 2019-11-18 | End: 2019-11-18 | Stop reason: HOSPADM

## 2019-11-18 RX ADMIN — PROPOFOL 50 MG: 10 INJECTION, EMULSION INTRAVENOUS at 08:48

## 2019-11-18 RX ADMIN — PROPOFOL 30 MG: 10 INJECTION, EMULSION INTRAVENOUS at 08:52

## 2019-11-18 RX ADMIN — PROPOFOL 50 MG: 10 INJECTION, EMULSION INTRAVENOUS at 08:46

## 2019-11-18 RX ADMIN — LIDOCAINE HYDROCHLORIDE 40 MG: 20 INJECTION, SOLUTION EPIDURAL; INFILTRATION; INTRACAUDAL; PERINEURAL at 08:45

## 2019-11-18 RX ADMIN — SODIUM CHLORIDE: 900 INJECTION, SOLUTION INTRAVENOUS at 08:42

## 2019-11-18 NOTE — H&P
Colon and Rectal Surgery History and Physical    Subjective:      Carito Soria is a 71 y.o. female who was self-referred for evaluation of COLONOSCOPY WITH POSSIBLE polypectomy. Patient complains of none. Patient denies none. There is a family history of colon cancer. Date of last colonoscopy: 2014, Polyps  No    Patient Active Problem List    Diagnosis Date Noted    Hyperlipidemia 06/15/2015    Tobacco abuse 06/15/2015    Agatston coronary artery calcium score greater than 400 06/15/2015     History reviewed. No pertinent past medical history. Past Surgical History:   Procedure Laterality Date    HX BREAST BIOPSY Left     years ago surgical benign      Social History     Tobacco Use    Smoking status: Current Every Day Smoker     Packs/day: 1.00     Types: Cigarettes    Smokeless tobacco: Never Used   Substance Use Topics    Alcohol use: Yes     Alcohol/week: 16.0 standard drinks     Types: 14 Standard drinks or equivalent, 2 Glasses of wine per week     Comment: nightly      Family History   Problem Relation Age of Onset    Cancer Mother     Heart Disease Father         cabg late 63's      Prior to Admission medications    Medication Sig Start Date End Date Taking? Authorizing Provider   conjugated estrogens (PREMARIN) 0.625 mg tablet Take  by mouth. Provider, Historical   atorvastatin (LIPITOR) 20 mg tablet Take  by mouth daily. Provider, Historical   fexofenadine (ALLEGRA) 60 mg tablet Take  by mouth as needed. Provider, Historical     Allergies   Allergen Reactions    Augmentin [Amoxicillin-Pot Clavulanate] Nausea and Vomiting        Review of Systems:    A comprehensive review of systems was negative except for that written in the History of Present Illness. Objective:     Visit Vitals  /83   Pulse 90   Temp 98.1 °F (36.7 °C)   Wt 49 kg (108 lb 2 oz)   SpO2 99%   Breastfeeding?  No   BMI 17.45 kg/m²        Physical Exam:   Visit Vitals  /83   Pulse 90   Temp 98.1 °F (36.7 °C)   Wt 49 kg (108 lb 2 oz)   SpO2 99%   Breastfeeding? No   BMI 17.45 kg/m²     General:  Alert, cooperative, no distress, appears stated age. Head:  Normocephalic, without obvious abnormality, atraumatic. Eyes:  Conjunctivae/corneas clear. PERRL, EOMs intact. Fundi benign. Ears:  Normal TMs and external ear canals both ears. Nose: Nares normal. Septum midline. Mucosa normal. No drainage or sinus tenderness. Throat: Lips, mucosa, and tongue normal. Teeth and gums normal.   Neck: Supple, symmetrical, trachea midline, no adenopathy, thyroid: no enlargement/tenderness/nodules, no carotid bruit and no JVD. Back:   Symmetric, no curvature. ROM normal. No CVA tenderness. Lungs:   Clear to auscultation bilaterally. Chest wall:  No tenderness or deformity. Heart:  Regular rate and rhythm, S1, S2 normal, no murmur, click, rub or gallop. Breast Exam:  No tenderness, masses, or nipple abnormality. Abdomen:   Soft, non-tender. Bowel sounds normal. No masses,  No organomegaly. Genitalia:  Normal female without lesion, discharge or tenderness. Rectal:  Normal tone,  no masses or tenderness  Guaiac negative stool. Extremities: Extremities normal, atraumatic, no cyanosis or edema. Pulses: 2+ and symmetric all extremities. Skin: Skin color, texture, turgor normal. No rashes or lesions. Lymph nodes: Cervical, supraclavicular, and axillary nodes normal.   Neurologic: CNII-XII intact. Normal strength, sensation and reflexes throughout. Imaging:  images and reports reviewed    Lab Review:  No results found for this or any previous visit (from the past 24 hour(s)). Labs and radiology: images and reports reviewed      Assessment:     71 y.o. female for a colonoscopy. Plan:     1. I recommend proceeding with colonoscopy Treatment alternatives were discussed.   2. Discussed aspects of surgical intervention, methods, risks (including by not limited to infection, bleeding, hematoma, and perforation of the intestines or solid organs), and the risks of general anesthetic. The patient understands the risks; any and all questions were answered to the patient's satisfaction.     Signed By: Dev Petersen MD     November 18, 2019

## 2019-11-18 NOTE — ROUTINE PROCESS
[place]  1955  [id number]    Situation:  Verbal report received from: Tamika rn  Procedure: Procedure(s):  COLONOSCOPY  ENDOSCOPIC POLYPECTOMY    Background:    Preoperative diagnosis: HISTORY OF POLYPS  Postoperative diagnosis: colon polyp    :  Dr. Prem Marcum  Assistant(s): Endoscopy RN-1: Leanne Paez RN  Endoscopy RN-2: Tiffany Diaz RN    Specimens:   ID Type Source Tests Collected by Time Destination   1 : pathology Preservative Colon, Transverse  Sadie Fountain MD 11/18/2019 9630 Pathology     H. Pylori  no    Assessment:  Intra-procedure medications     Anesthesia gave intra-procedure sedation and medications, see anesthesia flow sheet yes    Intravenous fluids: NS@ KVO     Vital signs stable     Abdominal assessment: round and soft     Recommendation:  Discharge patient per MD order.     Family or Friend   Permission to share finding with family or friend yes

## 2019-11-18 NOTE — ANESTHESIA POSTPROCEDURE EVALUATION
Post-Anesthesia Evaluation and Assessment    Patient: Nguyễn Bergeron MRN: [id number]  SSN: [id number]    YOB: 1955  Age: 71 y.o. Sex: female       Cardiovascular Function/Vital Signs  Visit Vitals  /71   Pulse 81   Temp 36.7 °C (98 °F)   Resp 18   Wt 49 kg (108 lb 2 oz)   SpO2 98%   Breastfeeding? No   BMI 17.45 kg/m²       Patient is status post MAC anesthesia for Procedure(s):  COLONOSCOPY  ENDOSCOPIC POLYPECTOMY. Nausea/Vomiting: None    Postoperative hydration reviewed and adequate. Pain:  Pain Scale 1: Numeric (0 - 10) (11/18/19 0906)  Pain Intensity 1: 0 (11/18/19 0906)   Managed    Neurological Status: At baseline    Mental Status and Level of Consciousness: Alert and oriented to person, place, and time    Pulmonary Status:   O2 Device: Room air (11/18/19 0906)   Adequate oxygenation and airway patent    Complications related to anesthesia: None    Post-anesthesia assessment completed. No concerns    Signed By: Matilda Camargo MD     November 18, 2019              Procedure(s):  COLONOSCOPY  ENDOSCOPIC POLYPECTOMY. MAC    <BSHSIANPOST>    Vitals Value Taken Time   /80 11/18/2019  9:08 AM   Temp 36.7 °C (98 °F) 11/18/2019  9:06 AM   Pulse 76 11/18/2019  9:11 AM   Resp 13 11/18/2019  9:11 AM   SpO2 100 % 11/18/2019  9:11 AM   Vitals shown include unvalidated device data.

## 2019-11-18 NOTE — DISCHARGE INSTRUCTIONS
Nguyễn Bergeron  [id number]  1955    COLON DISCHARGE INSTRUCTIONS  Discomfort:  Redness at IV site- apply warm compress to area; if redness or soreness persist- contact your physician  There may be a slight amount of blood passed from the rectum  Gaseous discomfort- walking, belching will help relieve any discomfort  You may not operate a vehicle for 12 hours  You may not engage in an occupation involving machinery or appliances for rest of today  You may not drink alcoholic beverages for at least 12 hours  Avoid making any critical decisions for at least 24 hour  DIET:   Regular diet. - however -  remember your colon is empty and a heavy meal will produce gas. Avoid these foods:  vegetables, fried / greasy foods, carbonated drinks for today    MEDICATIONS:         ACTIVITY:  You may resume your normal daily activities it is recommended that you spend the remainder of the day resting -  avoid any strenuous activity. CALL M.D. ANY SIGN OF:   Increasing pain, nausea, vomiting  Abdominal distension (swelling)  New increased bleeding (oral or rectal)  Fever (chills)  Pain in chest area  Patient Education        Colon Polyps: Care Instructions  Your Care Instructions    Colon polyps are growths in the colon or the rectum. The cause of most colon polyps is not known, and most people who get them do not have any problems. But a certain kind can turn into cancer. For this reason, regular testing for colon polyps is important for people as they get older. It is also important for anyone who has an increased risk for colon cancer. Polyps are usually found through routine colon cancer screening tests. Although most colon polyps are not cancerous, they are usually removed and then tested for cancer. Screening for colon cancer saves lives because the cancer can usually be cured if it is caught early. If you have a polyp that is the type that can turn into cancer, you may need more tests to examine your entire colon. The doctor will remove any other polyps that he or she finds, and you will be tested more often. Follow-up care is a key part of your treatment and safety. Be sure to make and go to all appointments, and call your doctor if you are having problems. It's also a good idea to know your test results and keep a list of the medicines you take. How can you care for yourself at home? Regular exams to look for colon polyps are the best way to prevent polyps from turning into colon cancer. These can include stool tests, sigmoidoscopy, colonoscopy, and CT colonography. Talk with your doctor about a testing schedule that is right for you. To prevent polyps  There is no home treatment that can prevent colon polyps. But these steps may help lower your risk for cancer. · Stay active. Being active can help you get to and stay at a healthy weight. Try to exercise on most days of the week. Walking is a good choice. · Eat well. Choose a variety of vegetables, fruits, legumes (such as peas and beans), fish, poultry, and whole grains. · Do not smoke. If you need help quitting, talk to your doctor about stop-smoking programs and medicines. These can increase your chances of quitting for good. · If you drink alcohol, limit how much you drink. Limit alcohol to 2 drinks a day for men and 1 drink a day for women. When should you call for help? Call your doctor now or seek immediate medical care if:    · You have severe belly pain.     · Your stools are maroon or very bloody.    Watch closely for changes in your health, and be sure to contact your doctor if:    · You have a fever.     · You have nausea or vomiting.     · You have a change in bowel habits (new constipation or diarrhea).     · Your symptoms get worse or are not improving as expected. Where can you learn more? Go to http://bria-mariposa.info/. Enter 95 [id number] in the search box to learn more about \"Colon Polyps: Care Instructions. \"  Current as of: December 19, 2018  Content Version: 12.2  © 5184-7271 [employer]. Care instructions adapted under license by [employer] (which disclaims liability or warranty for this information). If you have questions about a medical condition or this instruction, always ask your healthcare professional. [place] [address] any warranty or liability for your use of this information. Shortness of breath     Follow-up Instructions:   Call Tonny Hyman MD if any questions or problems. Telephone # [phone]  Biopsy results will be available in  7 to10 days  Should have a repeat colonoscopy in 5 years.     COLONOSCOPY FINDINGS:  Your colonoscopy showed: transverse colon polyp removed, otherwise normal.

## 2019-11-18 NOTE — OP NOTES
Janine Parisi  [id number]  1955    Colonoscopy Operative Report    Procedure Type:   Colonoscopy --screening     Pre-operative Diagnosis:  See indication above. Post-operative Diagnosis:  See findings below. Surgeon:  iMchelle Mc MD    Referring Provider: Jody Escalera MD    Sedation and Analgesia:  MAC anesthesia Propofol    Specimens Removed:  specimen #1, 6 mm in size, located in the transverse colon removed by snare cautery and retrieved for pathology    EBL:  0 mL. Complications: None apparent. Indication For Procedure:    Family history of coloretal cancer (screening only)    Findings:  polyp(s) #1, 6 mm in size, located in the transverse colon removed by snare cautery and retrieved for pathology      Procedure Details:  After informed consent was obtained, the patient was taken to the endoscopy suite where standard monitoring devices were attached and intravenous access was established. Sedation was administered by the anesthetist as needed throughout the procedure. The patient was placed in the left lateral decubitus position, and inspection of the perineum revealed no significant external lesions. Digital rectal examination revealed no masses. The Olympus videocolonoscope was lubricated and inserted transanally into the rectum. It was advanced into the colon and without difficulty to the cecum, which was identified by the presence of the ileocecal valve and the appendiceal orifice. The quality of the bowel preparation was excellent, as was the overall visualization. Careful inspection was performed during withdrawal of the colonoscope. There were no apparent complications, and the patient appeared to have tolerated the procedure well. At its conclusion, she was transported to the recovery area in good condition. Impression:    Transverse colon polyp 6 mm, sessile, otherwise normal    Recommendations: --Repeat colonoscopy in 5 years. Regular diet.   Resume normal medication(s).

## 2019-11-18 NOTE — ANESTHESIA PREPROCEDURE EVALUATION
Relevant Problems   No relevant active problems       Anesthetic History   No history of anesthetic complications            Review of Systems / Medical History  Patient summary reviewed, nursing notes reviewed and pertinent labs reviewed    Pulmonary          Smoker         Neuro/Psych   Within defined limits           Cardiovascular  Within defined limits                     GI/Hepatic/Renal  Within defined limits              Endo/Other  Within defined limits           Other Findings              Physical Exam    Airway  Mallampati: II  TM Distance: > 6 cm  Neck ROM: normal range of motion   Mouth opening: Normal     Cardiovascular  Regular rate and rhythm,  S1 and S2 normal,  no murmur, click, rub, or gallop             Dental  No notable dental hx       Pulmonary  Breath sounds clear to auscultation               Abdominal  GI exam deferred       Other Findings            Anesthetic Plan    ASA: 2  Anesthesia type: MAC          Induction: Intravenous  Anesthetic plan and risks discussed with: Patient

## 2020-08-13 ENCOUNTER — HOSPITAL ENCOUNTER (OUTPATIENT)
Dept: MAMMOGRAPHY | Age: 65
Discharge: HOME | End: 2020-08-13
Attending: FAMILY MEDICINE
Payer: MEDICARE

## 2020-08-13 DIAGNOSIS — Z12.31 VISIT FOR SCREENING MAMMOGRAM: ICD-10-CM

## 2020-08-13 PROCEDURE — 77063 BREAST TOMOSYNTHESIS BI: CPT

## 2020-11-16 ENCOUNTER — OFFICE VISIT (OUTPATIENT)
Dept: CARDIOLOGY CLINIC | Age: 65
End: 2020-11-16
Payer: MEDICARE

## 2020-11-16 ENCOUNTER — ANCILLARY PROCEDURE (OUTPATIENT)
Dept: CARDIOLOGY CLINIC | Age: 65
End: 2020-11-16
Payer: MEDICARE

## 2020-11-16 VITALS
HEART RATE: 84 BPM | SYSTOLIC BLOOD PRESSURE: 138 MMHG | HEIGHT: 66 IN | DIASTOLIC BLOOD PRESSURE: 78 MMHG | OXYGEN SATURATION: 97 % | WEIGHT: 110 LBS | BODY MASS INDEX: 17.68 KG/M2 | RESPIRATION RATE: 18 BRPM

## 2020-11-16 DIAGNOSIS — Z72.0 TOBACCO ABUSE: ICD-10-CM

## 2020-11-16 DIAGNOSIS — I77.9 CAROTID ARTERY DISEASE WITHOUT CEREBRAL INFARCTION (HCC): ICD-10-CM

## 2020-11-16 DIAGNOSIS — E78.2 MIXED HYPERLIPIDEMIA: ICD-10-CM

## 2020-11-16 DIAGNOSIS — R93.1 AGATSTON CORONARY ARTERY CALCIUM SCORE GREATER THAN 400: Primary | ICD-10-CM

## 2020-11-16 LAB
LEFT CCA DIST DIAS: 14.6 CENTIMETER/SECOND
LEFT CCA DIST SYS: 61.7 CENTIMETER/SECOND
LEFT CCA PROX DIAS: 27 CENTIMETER/SECOND
LEFT CCA PROX SYS: 96.9 CENTIMETER/SECOND
LEFT ECA DIAS: 20.73 CENTIMETER/SECOND
LEFT ECA SYS: 114 CENTIMETER/SECOND
LEFT ICA DIST DIAS: 26.5 CENTIMETER/SECOND
LEFT ICA DIST SYS: 88 CENTIMETER/SECOND
LEFT ICA MID DIAS: 37.5 CENTIMETER/SECOND
LEFT ICA MID SYS: 120.6 CENTIMETER/SECOND
LEFT ICA PROX DIAS: 36.7 CENTIMETER/SECOND
LEFT ICA PROX SYS: 116.4 CENTIMETER/SECOND
LEFT ICA/CCA SYS: 1.24
LEFT VERTEBRAL DIAS: 23.99 CENTIMETER/SECOND
LEFT VERTEBRAL SYS: 70.1 CENTIMETER/SECOND
RIGHT CCA DIST DIAS: 23.6 CENTIMETER/SECOND
RIGHT CCA DIST SYS: 87.1 CENTIMETER/SECOND
RIGHT CCA PROX DIAS: 19.8 CENTIMETER/SECOND
RIGHT CCA PROX SYS: 58.6 CENTIMETER/SECOND
RIGHT ECA DIAS: 28.18 CENTIMETER/SECOND
RIGHT ECA SYS: 110.2 CENTIMETER/SECOND
RIGHT ICA DIST DIAS: 30.1 CENTIMETER/SECOND
RIGHT ICA DIST SYS: 94.9 CENTIMETER/SECOND
RIGHT ICA MID DIAS: 36.6 CENTIMETER/SECOND
RIGHT ICA MID SYS: 124.7 CENTIMETER/SECOND
RIGHT ICA PROX DIAS: 31 CENTIMETER/SECOND
RIGHT ICA PROX SYS: 88.8 CENTIMETER/SECOND
RIGHT ICA/CCA SYS: 1.4
RIGHT VERTEBRAL DIAS: 9.02 CENTIMETER/SECOND
RIGHT VERTEBRAL SYS: 37.3 CENTIMETER/SECOND

## 2020-11-16 PROCEDURE — 93880 EXTRACRANIAL BILAT STUDY: CPT | Performed by: SPECIALIST

## 2020-11-16 PROCEDURE — G8536 NO DOC ELDER MAL SCRN: HCPCS | Performed by: SPECIALIST

## 2020-11-16 PROCEDURE — 3017F COLORECTAL CA SCREEN DOC REV: CPT | Performed by: SPECIALIST

## 2020-11-16 PROCEDURE — 1101F PT FALLS ASSESS-DOCD LE1/YR: CPT | Performed by: SPECIALIST

## 2020-11-16 PROCEDURE — 1090F PRES/ABSN URINE INCON ASSESS: CPT | Performed by: SPECIALIST

## 2020-11-16 PROCEDURE — G8510 SCR DEP NEG, NO PLAN REQD: HCPCS | Performed by: SPECIALIST

## 2020-11-16 PROCEDURE — G8400 PT W/DXA NO RESULTS DOC: HCPCS | Performed by: SPECIALIST

## 2020-11-16 PROCEDURE — G8427 DOCREV CUR MEDS BY ELIG CLIN: HCPCS | Performed by: SPECIALIST

## 2020-11-16 PROCEDURE — G8419 CALC BMI OUT NRM PARAM NOF/U: HCPCS | Performed by: SPECIALIST

## 2020-11-16 PROCEDURE — G9899 SCRN MAM PERF RSLTS DOC: HCPCS | Performed by: SPECIALIST

## 2020-11-16 PROCEDURE — 99213 OFFICE O/P EST LOW 20 MIN: CPT | Performed by: SPECIALIST

## 2020-11-16 PROCEDURE — G0463 HOSPITAL OUTPT CLINIC VISIT: HCPCS | Performed by: SPECIALIST

## 2020-11-16 NOTE — PROGRESS NOTES
HISTORY OF PRESENT ILLNESS  Jack Spann is a 76 y.o. female     SUMMARY:   Problem List  Date Reviewed: 11/16/2020          Codes Class Noted    Hyperlipidemia ICD-10-CM: E78.5  ICD-9-CM: 272.4  6/15/2015        Tobacco abuse ICD-10-CM: Z72.0  ICD-9-CM: 305.1  6/15/2015        Agatston coronary artery calcium score greater than 400 ICD-10-CM: R93.1  ICD-9-CM: 793.2  6/15/2015    Overview Addendum 6/17/2015  3:15 PM by Puja Dia MD     10/14 normal exercise cardiolyte stress test at [facility], lvef 62%  9/14 calcium score 471, 98th percentile,  intercardia                   Current Outpatient Medications on File Prior to Visit   Medication Sig    conjugated estrogens (PREMARIN) 0.625 mg tablet Take  by mouth.  atorvastatin (LIPITOR) 20 mg tablet Take  by mouth daily.  fexofenadine (ALLEGRA) 60 mg tablet Take  by mouth as needed. No current facility-administered medications on file prior to visit. CARDIOLOGY STUDIES TO DATE:  6/14 calcium score 471, 98th percentile  9/14 normal nuclear stress test per patient    5/17 normal stress echo  11/17 50-79% left and 10-49% right carotid stenoses  11/18 carotid dopplers unchanged  11/19 carotid dopplers unchanged    Chief Complaint   Patient presents with    Follow-up     HPI :  She is doing great. She walks just about every day though recently has had some trouble with sciatica which is slowed her down a little bit. No cardiac complaints, and her carotid Dopplers today showed stable findings. Her primary care is following her lipids. Unfortunately she still smoking.   CARDIAC ROS:   negative for chest pain, dyspnea, palpitations, syncope, orthopnea, paroxysmal nocturnal dyspnea, exertional chest pressure/discomfort, claudication, lower extremity edema    Family History   Problem Relation Age of Onset    Cancer Mother     Heart Disease Father         cabg late 63's       Past Medical History:   Diagnosis Date    Post-menopause GENERAL ROS:  A comprehensive review of systems was negative except for that written in the HPI. Visit Vitals  /78 (BP 1 Location: Left arm, BP Patient Position: Sitting)   Pulse 84   Resp 18   Ht 5' 6\" (1.676 m)   Wt 110 lb (49.9 kg)   SpO2 97%   BMI 17.75 kg/m²       Wt Readings from Last 3 Encounters:   11/16/20 110 lb (49.9 kg)   11/18/19 108 lb 2 oz (49 kg)   11/14/19 110 lb (49.9 kg)            BP Readings from Last 3 Encounters:   11/16/20 138/78   11/18/19 116/69   11/14/19 122/82       PHYSICAL EXAM  General appearance: alert, cooperative, no distress, appears stated age  Neurologic: Alert and oriented X 3  Neck: supple, symmetrical, trachea midline, no adenopathy, no carotid bruit and no JVD  Lungs: clear to auscultation bilaterally  Heart: regular rate and rhythm, S1, S2 normal, no murmur, click, rub or gallop  Extremities: extremities normal, atraumatic, no cyanosis or edema      ASSESSMENT :      She is stable and asymptomatic, well compensated on a good medical regimen and needs no further cardiac testing at this time. current treatment plan is effective, no change in therapy  lab results and schedule of future lab studies reviewed with patient  reviewed diet, exercise and weight control  very strongly urged to quit smoking to reduce cardiovascular risk    Encounter Diagnoses   Name Primary?  Agatston coronary artery calcium score greater than 400 Yes    Mixed hyperlipidemia     Tobacco abuse     Carotid artery disease without cerebral infarction ([facility] [address]      No orders of the defined types were placed in this encounter. Follow-up and Dispositions    · Return in about 1 year (around 11/16/2021). [address] [facility], MD  11/16/2020  Please note that this dictation was completed with [employer], the computer voice recognition software.   Quite often unanticipated grammatical, syntax, homophones, and other interpretive errors are inadvertently transcribed by the computer software. Please disregard these errors. Please excuse any errors that have escaped final proofreading. Thank you.

## 2021-08-12 ENCOUNTER — TRANSCRIBE ORDER (OUTPATIENT)
Dept: SCHEDULING | Age: 66
End: 2021-08-12

## 2021-08-12 DIAGNOSIS — Z12.31 SCREENING MAMMOGRAM FOR HIGH-RISK PATIENT: Primary | ICD-10-CM

## 2021-09-01 ENCOUNTER — HOSPITAL ENCOUNTER (OUTPATIENT)
Dept: MAMMOGRAPHY | Age: 66
Discharge: HOME | End: 2021-09-01
Attending: FAMILY MEDICINE
Payer: MEDICARE

## 2021-09-01 DIAGNOSIS — Z12.31 SCREENING MAMMOGRAM FOR HIGH-RISK PATIENT: ICD-10-CM

## 2021-09-01 PROCEDURE — 77063 BREAST TOMOSYNTHESIS BI: CPT

## 2021-11-19 ENCOUNTER — OFFICE VISIT (OUTPATIENT)
Dept: CARDIOLOGY CLINIC | Age: 66
End: 2021-11-19
Payer: MEDICARE

## 2021-11-19 ENCOUNTER — ANCILLARY PROCEDURE (OUTPATIENT)
Dept: CARDIOLOGY CLINIC | Age: 66
End: 2021-11-19
Payer: MEDICARE

## 2021-11-19 VITALS
DIASTOLIC BLOOD PRESSURE: 76 MMHG | SYSTOLIC BLOOD PRESSURE: 120 MMHG | HEIGHT: 66 IN | HEART RATE: 68 BPM | WEIGHT: 107.6 LBS | OXYGEN SATURATION: 97 % | BODY MASS INDEX: 17.29 KG/M2 | RESPIRATION RATE: 14 BRPM

## 2021-11-19 DIAGNOSIS — E78.2 MIXED HYPERLIPIDEMIA: ICD-10-CM

## 2021-11-19 DIAGNOSIS — I77.9 CAROTID ARTERY DISEASE WITHOUT CEREBRAL INFARCTION (HCC): ICD-10-CM

## 2021-11-19 DIAGNOSIS — Z72.0 TOBACCO ABUSE: ICD-10-CM

## 2021-11-19 DIAGNOSIS — R93.1 AGATSTON CORONARY ARTERY CALCIUM SCORE GREATER THAN 400: Primary | ICD-10-CM

## 2021-11-19 LAB
LEFT CCA DIST DIAS: 22.2 CENTIMETER/SECOND
LEFT CCA DIST SYS: 88.4 CENTIMETER/SECOND
LEFT CCA PROX DIAS: 29.5 CENTIMETER/SECOND
LEFT CCA PROX SYS: 123.6 CENTIMETER/SECOND
LEFT ECA DIAS: 29.94 CENTIMETER/SECOND
LEFT ECA SYS: 163.5 CENTIMETER/SECOND
LEFT ICA DIST DIAS: 28.6 CENTIMETER/SECOND
LEFT ICA DIST SYS: 111.3 CENTIMETER/SECOND
LEFT ICA MID DIAS: 41.7 CENTIMETER/SECOND
LEFT ICA MID SYS: 126.5 CENTIMETER/SECOND
LEFT ICA PROX DIAS: 35.8 CENTIMETER/SECOND
LEFT ICA PROX SYS: 113 CENTIMETER/SECOND
LEFT ICA/CCA SYS: 1.02
LEFT VERTEBRAL DIAS: 14.29 CENTIMETER/SECOND
LEFT VERTEBRAL SYS: 46 CENTIMETER/SECOND
RIGHT CCA DIST DIAS: 23.6 CENTIMETER/SECOND
RIGHT CCA DIST SYS: 91.9 CENTIMETER/SECOND
RIGHT CCA PROX DIAS: 19.5 CENTIMETER/SECOND
RIGHT CCA PROX SYS: 75.5 CENTIMETER/SECOND
RIGHT ECA DIAS: 15.7 CENTIMETER/SECOND
RIGHT ECA SYS: 110.1 CENTIMETER/SECOND
RIGHT ICA DIST DIAS: 27.6 CENTIMETER/SECOND
RIGHT ICA DIST SYS: 71.5 CENTIMETER/SECOND
RIGHT ICA MID DIAS: 25 CENTIMETER/SECOND
RIGHT ICA MID SYS: 89.5 CENTIMETER/SECOND
RIGHT ICA PROX DIAS: 49.5 CENTIMETER/SECOND
RIGHT ICA PROX SYS: 162.9 CENTIMETER/SECOND
RIGHT ICA/CCA SYS: 1.8
RIGHT VERTEBRAL SYS: 54.5 CENTIMETER/SECOND

## 2021-11-19 PROCEDURE — G8536 NO DOC ELDER MAL SCRN: HCPCS | Performed by: SPECIALIST

## 2021-11-19 PROCEDURE — G8419 CALC BMI OUT NRM PARAM NOF/U: HCPCS | Performed by: SPECIALIST

## 2021-11-19 PROCEDURE — 99213 OFFICE O/P EST LOW 20 MIN: CPT | Performed by: SPECIALIST

## 2021-11-19 PROCEDURE — G8400 PT W/DXA NO RESULTS DOC: HCPCS | Performed by: SPECIALIST

## 2021-11-19 PROCEDURE — G8510 SCR DEP NEG, NO PLAN REQD: HCPCS | Performed by: SPECIALIST

## 2021-11-19 PROCEDURE — 1101F PT FALLS ASSESS-DOCD LE1/YR: CPT | Performed by: SPECIALIST

## 2021-11-19 PROCEDURE — 1090F PRES/ABSN URINE INCON ASSESS: CPT | Performed by: SPECIALIST

## 2021-11-19 PROCEDURE — 93880 EXTRACRANIAL BILAT STUDY: CPT | Performed by: SPECIALIST

## 2021-11-19 PROCEDURE — G0463 HOSPITAL OUTPT CLINIC VISIT: HCPCS | Performed by: SPECIALIST

## 2021-11-19 PROCEDURE — G9899 SCRN MAM PERF RSLTS DOC: HCPCS | Performed by: SPECIALIST

## 2021-11-19 PROCEDURE — G8427 DOCREV CUR MEDS BY ELIG CLIN: HCPCS | Performed by: SPECIALIST

## 2021-11-19 PROCEDURE — 3017F COLORECTAL CA SCREEN DOC REV: CPT | Performed by: SPECIALIST

## 2021-11-19 NOTE — PROGRESS NOTES
HISTORY OF PRESENT ILLNESS  Joselito Whitten is a 78 y.o. female     SUMMARY:   Problem List  Date Reviewed: 11/19/2021          Codes Class Noted    Hyperlipidemia ICD-10-CM: E78.5  ICD-9-CM: 272.4  6/15/2015        Tobacco abuse ICD-10-CM: Z72.0  ICD-9-CM: 305.1  6/15/2015        Agatston coronary artery calcium score greater than 400 ICD-10-CM: R93.1  ICD-9-CM: 793.2  6/15/2015    Overview Addendum 6/17/2015  3:15 PM by Satinder Klein MD     10/14 normal exercise cardiolyte stress test at [facility], lvef 62%  9/14 calcium score 471, 98th percentile,  intercardia                   Current Outpatient Medications on File Prior to Visit   Medication Sig    conjugated estrogens (PREMARIN) 0.625 mg tablet Take  by mouth.  atorvastatin (LIPITOR) 20 mg tablet Take 10 mg by mouth daily.  fexofenadine (ALLEGRA) 60 mg tablet Take  by mouth as needed. No current facility-administered medications on file prior to visit. CARDIOLOGY STUDIES TO DATE:  6/14 calcium score 471, 98th percentile  9/14 normal nuclear stress test per patient    5/17 normal stress echo  11/17 50-79% left and 10-49% right carotid stenoses  11/18 carotid dopplers unchanged  11/19 carotid dopplers unchanged  11/20 carotid dopplers unchanged  11/21 slight progression on right, carotid dopplers otherwise unchanged  Chief Complaint   Patient presents with    Follow-up     HPI :  She has been getting a low-dose chest CTs for the last few years and that this time around they picked up a nodule. She is scheduled for a PET scan and may need a surgical resection. She continues to walk and exercise work in the yard without any symptoms suggestive of angina or heart failure. She still smoking but cutting back. She and her  bought a 2 bedroom condo and [place]. Her primary care is following her lipids. Carotid Dopplers today showed mild progression on the right but were otherwise unchanged.   CARDIAC ROS:   negative for chest pain, dyspnea, palpitations, syncope, orthopnea, paroxysmal nocturnal dyspnea, exertional chest pressure/discomfort, claudication, lower extremity edema    Family History   Problem Relation Age of Onset    Cancer Mother     Heart Disease Father         cabg late 63's       Past Medical History:   Diagnosis Date    Post-menopause        GENERAL ROS:  A comprehensive review of systems was negative except for that written in the HPI. Visit Vitals  /76 (BP 1 Location: Left arm, BP Patient Position: Sitting, BP Cuff Size: Adult)   Pulse 68   Resp 14   Ht 5' 6\" (1.676 m)   Wt 107 lb 9.6 oz (48.8 kg)   SpO2 97%   BMI 17.37 kg/m²       Wt Readings from Last 3 Encounters:   11/19/21 107 lb 9.6 oz (48.8 kg)   11/16/20 110 lb (49.9 kg)   11/18/19 108 lb 2 oz (49 kg)            BP Readings from Last 3 Encounters:   11/19/21 120/76   11/16/20 138/78   11/18/19 116/69       PHYSICAL EXAM  General appearance: alert, cooperative, no distress, appears stated age  Neurologic: Alert and oriented X 3  Neck: supple, symmetrical, trachea midline, no adenopathy, shay carotid bruit and no JVD  Lungs: clear to auscultation bilaterally  Heart: regular rate and rhythm, S1, S2 normal, no murmur, click, rub or gallop  Extremities: extremities normal, atraumatic, no cyanosis or edema      ASSESSMENT :      She is stable and asymptomatic from a cardiac standpoint and if surgery is required she should be able to proceed without special precautions or further cardiac testing. We will repeat her carotid Dopplers in 1 year. current treatment plan is effective, no change in therapy  lab results and schedule of future lab studies reviewed with patient  reviewed diet, exercise and weight control  very strongly urged to quit smoking to reduce cardiovascular risk    Encounter Diagnoses   Name Primary?     Agatston coronary artery calcium score greater than 400 Yes    Tobacco abuse     Mixed hyperlipidemia     Carotid artery disease without cerebral infarction ([facility] [address]      No orders of the defined types were placed in this encounter. Follow-up and Dispositions    · Return in about 1 year (around 11/19/2022). Keith Garcia MD  11/19/2021  Please note that this dictation was completed with [employer], the computer voice recognition software. Quite often unanticipated grammatical, syntax, homophones, and other interpretive errors are inadvertently transcribed by the computer software. Please disregard these errors. Please excuse any errors that have escaped final proofreading. Thank you.

## 2022-08-10 ENCOUNTER — TRANSCRIBE ORDER (OUTPATIENT)
Dept: SCHEDULING | Age: 67
End: 2022-08-10

## 2022-08-10 DIAGNOSIS — Z12.31 VISIT FOR SCREENING MAMMOGRAM: Primary | ICD-10-CM

## 2022-09-13 ENCOUNTER — APPOINTMENT (OUTPATIENT)
Dept: CT IMAGING | Age: 67
End: 2022-09-13
Attending: NURSE PRACTITIONER
Payer: MEDICARE

## 2022-09-13 ENCOUNTER — APPOINTMENT (OUTPATIENT)
Dept: GENERAL RADIOLOGY | Age: 67
End: 2022-09-13
Attending: NURSE PRACTITIONER
Payer: MEDICARE

## 2022-09-13 ENCOUNTER — HOSPITAL ENCOUNTER (EMERGENCY)
Age: 67
Discharge: HOME | End: 2022-09-13
Attending: EMERGENCY MEDICINE
Payer: MEDICARE

## 2022-09-13 VITALS
RESPIRATION RATE: 18 BRPM | OXYGEN SATURATION: 99 % | WEIGHT: 110 LBS | HEIGHT: 66 IN | DIASTOLIC BLOOD PRESSURE: 75 MMHG | HEART RATE: 84 BPM | TEMPERATURE: 98.3 F | BODY MASS INDEX: 17.68 KG/M2 | SYSTOLIC BLOOD PRESSURE: 158 MMHG

## 2022-09-13 DIAGNOSIS — S83.92XA KNEE SPRAIN, BILATERAL: ICD-10-CM

## 2022-09-13 DIAGNOSIS — S06.0X0A CONCUSSION WITHOUT LOSS OF CONSCIOUSNESS, INITIAL ENCOUNTER: ICD-10-CM
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
## 2022-09-13 DIAGNOSIS — S83.91XA KNEE SPRAIN, BILATERAL: ICD-10-CM

## 2022-09-13 DIAGNOSIS — T07.XXXA ABRASIONS OF MULTIPLE SITES: ICD-10-CM

## 2022-09-13 DIAGNOSIS — M50.30 DEGENERATIVE DISC DISEASE, CERVICAL: ICD-10-CM

## 2022-09-13 PROCEDURE — 72125 CT NECK SPINE W/O DYE: CPT

## 2022-09-13 PROCEDURE — 70450 CT HEAD/BRAIN W/O DYE: CPT

## 2022-09-13 PROCEDURE — 73090 X-RAY EXAM OF FOREARM: CPT

## 2022-09-13 PROCEDURE — 99284 EMERGENCY DEPT VISIT MOD MDM: CPT

## 2022-09-13 PROCEDURE — 73562 X-RAY EXAM OF KNEE 3: CPT

## 2022-09-13 PROCEDURE — 74011250636 HC RX REV CODE- 250/636: Performed by: NURSE PRACTITIONER

## 2022-09-13 PROCEDURE — 90471 IMMUNIZATION ADMIN: CPT

## 2022-09-13 PROCEDURE — 70486 CT MAXILLOFACIAL W/O DYE: CPT

## 2022-09-13 PROCEDURE — 90714 TD VACC NO PRESV 7 YRS+ IM: CPT | Performed by: NURSE PRACTITIONER

## 2022-09-13 RX ORDER — IBUPROFEN 600 MG/1
600 TABLET ORAL
Qty: 20 TABLET | Refills: 0 | Status: SHIPPED | OUTPATIENT
Start: 2022-09-13

## 2022-09-13 RX ADMIN — TETANUS AND DIPHTHERIA TOXOIDS ADSORBED 0.5 ML: 2; 2 INJECTION INTRAMUSCULAR at 10:08

## 2022-09-13 NOTE — ED PROVIDER NOTES
This is a 59-year-old female who presents to the emergency room by way of EMS after a fall. Patient states she was walking her dog when the dog got spooked and started to run. Patient states she fell, striking her face onto the pavement along with her bilateral arms and both knees. Patient states she was unable to stand secondary to the pain in her knees and was helped up by a neighbor. States she did hit her head but did not have any loss of consciousness. Does not take any blood thinners. Has not taken any medication prior to arrival for her symptoms. Also adds that her face is exceptionally red secondary to needling she had done yesterday. Patient is currently denying any chest pain, shortness of breath, dizziness, nausea or vomiting, fevers or chills. Denies any need for pain medication at the current time. There are no further complaints at this time. Adrian Diaz MD  Past Medical History:  No date: Post-menopause  Past Surgical History:  11/18/2019: COLONOSCOPY; N/A      Comment:  COLONOSCOPY performed by Lin Ngo MD at [facility]                ENDOSCOPY  No date: HX BREAST BIOPSY;  Left      Comment:  years ago surgical benign         Past Medical History:   Diagnosis Date    Post-menopause        Past Surgical History:   Procedure Laterality Date    COLONOSCOPY N/A 11/18/2019    COLONOSCOPY performed by Lin Ngo MD at [facility] ENDOSCOPY    HX BREAST BIOPSY Left     years ago surgical benign         Family History:   Problem Relation Age of Onset    Cancer Mother     Heart Disease Father         cabg late 63's       Social History     Socioeconomic History    Marital status:      Spouse name: Not on file    Number of children: Not on file    Years of education: Not on file    Highest education level: Not on file   Occupational History    Not on file   Tobacco Use    Smoking status: Every Day     Packs/day: 1.00     Types: Cigarettes    Smokeless tobacco: Never   Substance and Sexual Activity    Alcohol use: Yes     Alcohol/week: 16.0 standard drinks     Types: 14 Standard drinks or equivalent, 2 Glasses of wine per week     Comment: nightly    Drug use: No    Sexual activity: Not on file   Other Topics Concern    Not on file   Social History Narrative    Not on file     Social Determinants of Health     Financial Resource Strain: Not on file   Food Insecurity: Not on file   Transportation Needs: Not on file   Physical Activity: Not on file   Stress: Not on file   Social Connections: Not on file   Intimate Partner Violence: Not on file   Housing Stability: Not on file         ALLERGIES: Augmentin [amoxicillin-pot clavulanate]    Review of Systems   Constitutional:  Negative for appetite change, chills, diaphoresis, fatigue and fever. HENT:  Negative for congestion, sore throat and trouble swallowing. Eyes:  Negative for photophobia, redness and visual disturbance. Respiratory:  Negative for chest tightness, shortness of breath and wheezing. Cardiovascular:  Negative for chest pain and palpitations. Gastrointestinal:  Negative for abdominal distention, abdominal pain, nausea and vomiting. Endocrine: Negative. Genitourinary:  Negative for difficulty urinating, flank pain, frequency and urgency. Musculoskeletal:  Positive for arthralgias (bilateral knee pain, bilateral forearm pain and facial pain). Negative for back pain, neck pain and neck stiffness. Skin:  Positive for wound (Abrasions to bilateral knees, bilateral forearms, facial abrasions). Negative for color change, pallor and rash. Allergic/Immunologic: Negative. Neurological:  Negative for dizziness, speech difficulty, weakness and headaches. Hematological:  Does not bruise/bleed easily. Psychiatric/Behavioral:  Negative for behavioral problems. The patient is hyperactive. The patient is not nervous/anxious.       Vitals:    09/13/22 0836 09/13/22 0841   BP: (!) 158/75    Pulse: 84    Resp: 18    Temp: 98.3 °F (36.8 °C)    SpO2: 99%    Weight:  49.9 kg (110 lb)   Height:  5' 6\" (1.676 m)            Physical Exam  Vitals and nursing note reviewed. Constitutional:       General: She is not in acute distress. Appearance: Normal appearance. She is well-developed. She is not ill-appearing. HENT:      Head: Normocephalic. Comments: Face with multiple abrasions, small laceration to the bridge of the nose which is hemostatic. Right Ear: External ear normal.      Left Ear: External ear normal.      Nose: No congestion. Comments: Small laceration to the bridge of the nose, hemostatic at present. Mouth/Throat:      Mouth: Mucous membranes are moist.   Eyes:      General:         Right eye: No discharge. Left eye: No discharge. Conjunctiva/sclera: Conjunctivae normal.      Pupils: Pupils are equal, round, and reactive to light. Neck:      Vascular: No JVD. Trachea: No tracheal deviation. Comments: No pain on palpation to the cervical spine, no step-offs, no deformities. Full range of motion with no neurological deficits. Cardiovascular:      Rate and Rhythm: Normal rate and regular rhythm. Pulses: Normal pulses. Heart sounds: Normal heart sounds. No murmur heard. No gallop. Pulmonary:      Effort: Pulmonary effort is normal. No respiratory distress. Breath sounds: Normal breath sounds. Chest:      Chest wall: No tenderness. Abdominal:      General: Bowel sounds are normal. There is no distension. Palpations: Abdomen is soft. Tenderness: There is no abdominal tenderness. There is no guarding or rebound. Genitourinary:     Comments: Negative    Musculoskeletal:         General: Tenderness (Pain on palpation to the bilateral knees, bilateral forearms. No pain on palpation to the thoracic or lumbar spine, no step-offs, no deformities. ) present. Normal range of motion. Cervical back: Normal range of motion and neck supple. No tenderness. Skin:     General: Skin is warm. Capillary Refill: Capillary refill takes less than 2 seconds. Coloration: Skin is not pale. Findings: No erythema or rash. Comments: Multiple abrasions to her face, knees, forearms noted. Neurological:      General: No focal deficit present. Mental Status: She is alert and oriented to person, place, and time. Motor: No weakness. Coordination: Coordination normal.   Psychiatric:         Mood and Affect: Mood normal.         Behavior: Behavior normal.         Thought Content: Thought content normal.         Judgment: Judgment normal.        MDM  Number of Diagnoses or Management Options  Abrasions of multiple sites: new and requires workup  Concussion without loss of consciousness, initial encounter: new and requires workup  Degenerative disc disease, cervical: new and requires workup  Fall, initial encounter: new and requires workup  Knee sprain, bilateral: new and requires workup  Diagnosis management comments: Differential diagnosis includes concussion, facial fractures, knee fractures and others. After physical assessment and review of imaging, patient was discharged home and will follow up with PCP. Return to the emergency room with worsening symptoms. Patient in agreement with plan of care. Amount and/or Complexity of Data Reviewed  Tests in the radiology section of CPT®: ordered and reviewed         Labs Reviewed - No data to display  XR FOREARM LT AP/LAT    Result Date: 9/13/2022  No acute abnormality. XR FOREARM RT AP/LAT    Result Date: 9/13/2022  No acute abnormality. CT HEAD WO CONT    Result Date: 9/13/2022  Frontal scalp hematoma. No acute intracranial abnormality     CT MAXILLOFACIAL WO CONT    Result Date: 9/13/2022  No fracture. CT SPINE CERV WO CONT    Result Date: 9/13/2022  1. No acute abnormality      XR KNEE LT 3 V    Result Date: 9/13/2022  No acute abnormality.     XR KNEE RT 3 V    Result Date: 9/13/2022  Anterior soft tissue swelling. No acute fracture. 10:35 AM  Pt has been reexamined. Pt has no new complaints, changes or physical findings. Care plan outlined and precautions discussed. All available results were reviewed with pt. All medications were reviewed with pt. All of pt's questions and concerns were addressed. Pt agrees to F/U as instructed and agrees to return to ED upon further deterioration. Pt is ready to go home. Cira Palacio NP    Please note that this dictation was completed with [employer], the [employer] voice recognition software. Quite often unanticipated grammatical, syntax, homophones, and other interpretive errors are inadvertently transcribed by the computer software. Please disregard these errors. Please excuse any errors that have escaped final proofreading. Thank you.     Procedures

## 2022-09-13 NOTE — Clinical Note
[address]  [address] [facility] [address]  [phone]    Work/School Note    Date: 9/13/2022    To Whom It May concern:    Ena Staples was seen and treated today in the emergency room by the following provider(s):  Attending Provider: Sylvester Foster MD  Nurse Practitioner: Neil Uriarte NP. Ena Staples is excused from work/school on 09/13/22 and 09/14/22. She is medically clear to return to work/school on 9/15/2022.        Sincerely,          Daniel Figueroa NP

## 2022-09-13 NOTE — ED TRIAGE NOTES
Pt arrives for ground level fall while walking her dog this am, striking her face into pavement, both knees and left wrist. Pain is worse in head and knees. Denies LOC, does not take any blood thinners. Pain 2/10.

## 2022-09-27 ENCOUNTER — HOSPITAL ENCOUNTER (OUTPATIENT)
Dept: MAMMOGRAPHY | Age: 67
Discharge: HOME | End: 2022-09-27
Attending: FAMILY MEDICINE
Payer: MEDICARE

## 2022-09-27 DIAGNOSIS — Z12.31 VISIT FOR SCREENING MAMMOGRAM: ICD-10-CM

## 2022-09-27 PROCEDURE — 77063 BREAST TOMOSYNTHESIS BI: CPT

## 2022-11-30 ENCOUNTER — OFFICE VISIT (OUTPATIENT)
Dept: CARDIOLOGY CLINIC | Age: 67
End: 2022-11-30
Payer: MEDICARE

## 2022-11-30 ENCOUNTER — ANCILLARY PROCEDURE (OUTPATIENT)
Dept: CARDIOLOGY CLINIC | Age: 67
End: 2022-11-30

## 2022-11-30 VITALS
SYSTOLIC BLOOD PRESSURE: 117 MMHG | BODY MASS INDEX: 17.52 KG/M2 | HEART RATE: 67 BPM | OXYGEN SATURATION: 98 % | WEIGHT: 109 LBS | DIASTOLIC BLOOD PRESSURE: 80 MMHG | HEIGHT: 66 IN | RESPIRATION RATE: 18 BRPM

## 2022-11-30 DIAGNOSIS — I65.23 CAROTID STENOSIS, ASYMPTOMATIC, BILATERAL: ICD-10-CM

## 2022-11-30 DIAGNOSIS — Z72.0 TOBACCO ABUSE: ICD-10-CM

## 2022-11-30 DIAGNOSIS — I77.9 CAROTID ARTERY DISEASE WITHOUT CEREBRAL INFARCTION (HCC): Primary | ICD-10-CM

## 2022-11-30 DIAGNOSIS — R93.1 AGATSTON CORONARY ARTERY CALCIUM SCORE GREATER THAN 400: ICD-10-CM

## 2022-11-30 DIAGNOSIS — E78.2 MIXED HYPERLIPIDEMIA: ICD-10-CM

## 2022-11-30 LAB
LEFT CCA DIST DIAS: 20.6 CM/S
LEFT CCA DIST SYS: 96.8 CM/S
LEFT CCA PROX DIAS: 26.8 CM/S
LEFT CCA PROX SYS: 120.6 CM/S
LEFT ECA DIAS: 29.4 CM/S
LEFT ECA SYS: 167.6 CM/S
LEFT ICA DIST DIAS: 26.8 CM/S
LEFT ICA DIST SYS: 95 CM/S
LEFT ICA MID DIAS: 35.4 CM/S
LEFT ICA MID SYS: 112.1 CM/S
LEFT ICA PROX DIAS: 35.4 CM/S
LEFT ICA PROX SYS: 114.2 CM/S
LEFT ICA/CCA SYS: 1.2 NO UNITS
LEFT VERTEBRAL DIAS: 22.6 CM/S
LEFT VERTEBRAL SYS: 80.1 CM/S
RIGHT CCA DIST DIAS: 18.1 CM/S
RIGHT CCA DIST SYS: 92.5 CM/S
RIGHT CCA PROX DIAS: 20.7 CM/S
RIGHT CCA PROX SYS: 76.8 CM/S
RIGHT ECA DIAS: 23.3 CM/S
RIGHT ECA SYS: 117.6 CM/S
RIGHT ICA DIST DIAS: 30.2 CM/S
RIGHT ICA DIST SYS: 83.1 CM/S
RIGHT ICA MID DIAS: 21.9 CM/S
RIGHT ICA MID SYS: 115.1 CM/S
RIGHT ICA PROX DIAS: 45.2 CM/S
RIGHT ICA PROX SYS: 183.3 CM/S
RIGHT ICA/CCA SYS: 2 NO UNITS
RIGHT VERTEBRAL DIAS: 9.2 CM/S
RIGHT VERTEBRAL SYS: 40.1 CM/S

## 2022-11-30 RX ORDER — BROMFENAC SODIUM 0.7 MG/ML
SOLUTION/ DROPS OPHTHALMIC
COMMUNITY
Start: 2022-11-07

## 2022-11-30 RX ORDER — PREDNISOLONE ACETATE 10 MG/ML
SUSPENSION/ DROPS OPHTHALMIC
COMMUNITY
Start: 2022-11-07

## 2022-11-30 RX ORDER — ATORVASTATIN CALCIUM 10 MG/1
TABLET, FILM COATED ORAL
COMMUNITY
Start: 2022-11-07

## 2022-11-30 NOTE — PROGRESS NOTES
HISTORY OF PRESENT ILLNESS  Suzanne Whiting is a 75 y.o. female     SUMMARY:   Problem List  Date Reviewed: 11/29/2022            Codes Class Noted    Hyperlipidemia ICD-10-CM: E78.5  ICD-9-CM: 272.4  6/15/2015        Tobacco abuse ICD-10-CM: Z72.0  ICD-9-CM: 305.1  6/15/2015        Agatston coronary artery calcium score greater than 400 ICD-10-CM: R93.1  ICD-9-CM: 793.2  6/15/2015    Overview Addendum 6/17/2015  3:15 PM by Ellen Anderson MD     10/14 normal exercise cardiolyte stress test at [facility], lvef 62%  9/14 calcium score 471, 98th percentile,  intercardia                Current Outpatient Medications on File Prior to Visit   Medication Sig    Prolensa 0.07 % ophthalmic solution 1 DROP INTO RIGHT EYE OPHTHALMIC ONCE A DAY 30 DAYS    prednisoLONE acetate (PRED FORTE) 1 % ophthalmic suspension 1 DROP INTO RIGHT EYE OPHTHALMIC FOUR TIMES PER DAY 30 DAYS    atorvastatin (LIPITOR) 10 mg tablet TAKE 1 TABLET BY MOUTH EVERY DAY FOR 90 DAYS    ibuprofen (MOTRIN) 600 mg tablet Take 1 Tablet by mouth every six (6) hours as needed for Pain. conjugated estrogens (PREMARIN) 0.625 mg tablet Take  by mouth. fexofenadine (ALLEGRA) 60 mg tablet Take  by mouth as needed. No current facility-administered medications on file prior to visit. CARDIOLOGY STUDIES TO DATE:  6/14 calcium score 471, 98th percentile  9/14 normal nuclear stress test per patient    5/17 normal stress echo  11/17 50-79% left and 10-49% right carotid stenoses  11/18 carotid dopplers unchanged  11/19 carotid dopplers unchanged  11/20 carotid dopplers unchanged  11/21 slight progression on right, carotid dopplers otherwise unchanged    Chief Complaint   Patient presents with    Follow-up     HPI :  She is doing great with no cardiac complaints or problems with her medications. She is very active and unfortunately she still smoking.   Primary care is following her lipids and her carotids today showed stable disease  CARDIAC ROS: negative for chest pain, dyspnea, palpitations, syncope, orthopnea, paroxysmal nocturnal dyspnea, exertional chest pressure/discomfort, claudication, lower extremity edema    Family History   Problem Relation Age of Onset    Cancer Mother     Heart Disease Father         cabg late 63's       Past Medical History:   Diagnosis Date    Post-menopause        GENERAL ROS:  A comprehensive review of systems was negative except for that written in the HPI. Visit Vitals  /80 (BP 1 Location: Left upper arm, BP Patient Position: Sitting, BP Cuff Size: Adult)   Pulse 67   Resp 18   Ht 5' 6\" (1.676 m)   Wt 109 lb (49.4 kg)   SpO2 98%   BMI 17.59 kg/m²       Wt Readings from Last 3 Encounters:   11/30/22 109 lb (49.4 kg)   09/13/22 110 lb (49.9 kg)   11/19/21 107 lb 9.6 oz (48.8 kg)            BP Readings from Last 3 Encounters:   11/30/22 117/80   09/13/22 (!) 158/75   11/19/21 120/76       PHYSICAL EXAM  General appearance: alert, cooperative, no distress, appears stated age  Neurologic: Alert and oriented X 3  Neck: supple, symmetrical, trachea midline, no adenopathy, no carotid bruit, and no JVD  Lungs: clear to auscultation bilaterally  Heart: regular rate and rhythm, S1, S2 normal, no murmur, click, rub or gallop  Extremities: extremities normal, atraumatic, no cyanosis or edema      ASSESSMENT :      She is stable and asymptomatic, well compensated on a good medical regimen and needs no cardiac testing at this time. We will recheck her carotids in 1 year  current treatment plan is effective, no change in therapy  lab results and schedule of future lab studies reviewed with patient  reviewed diet, exercise and weight control  very strongly urged to quit smoking to reduce cardiovascular risk    Encounter Diagnoses   Name Primary?     Carotid artery disease without cerebral infarction (HCC) Yes    Mixed hyperlipidemia     Agatston coronary artery calcium score greater than 400     Tobacco abuse      Orders Placed This Encounter    Prolensa 0.07 % ophthalmic solution    prednisoLONE acetate (PRED FORTE) 1 % ophthalmic suspension    atorvastatin (LIPITOR) 10 mg tablet       Follow-up and Dispositions    Return in about 1 year (around 11/30/2023). Ana María Mendez MD  11/30/2022  Please note that this dictation was completed with [employer] the computer voice recognition software. Quite often unanticipated grammatical, syntax, homophones, and other interpretive errors are inadvertently transcribed by the computer software. Please disregard these errors. Please excuse any errors that have escaped final proofreading. Thank you.

## 2023-07-12 ENCOUNTER — TELEPHONE (OUTPATIENT)
Age: 68
End: 2023-07-12

## 2023-07-12 DIAGNOSIS — I65.23 OCCLUSION AND STENOSIS OF BILATERAL CAROTID ARTERIES: Primary | ICD-10-CM

## 2023-07-12 NOTE — TELEPHONE ENCOUNTER
Pt called to get scheduled for a Vascular Duplex Carotid Bilateral, pt didn't have a active order in chart. Pt would like to be called when order is placed to schedule this procedure.        Pt # [phone]

## 2023-07-13 NOTE — TELEPHONE ENCOUNTER
Verified Patient with two identifiers  Patient stated that she has Duplex Carotid Bilateral every year in the fall.   Can I order test?

## 2023-09-29 ENCOUNTER — HOSPITAL ENCOUNTER (OUTPATIENT)
Facility: HOSPITAL | Age: 68
End: 2023-09-29
Attending: FAMILY MEDICINE
Payer: MEDICARE

## 2023-09-29 VITALS — HEIGHT: 66 IN | WEIGHT: 110 LBS | BODY MASS INDEX: 17.68 KG/M2

## 2023-09-29 DIAGNOSIS — Z12.31 SCREENING MAMMOGRAM FOR HIGH-RISK PATIENT: ICD-10-CM

## 2023-09-29 PROCEDURE — 77063 BREAST TOMOSYNTHESIS BI: CPT

## 2023-10-03 ENCOUNTER — TRANSCRIBE ORDERS (OUTPATIENT)
Facility: HOSPITAL | Age: 68
End: 2023-10-03

## 2023-10-03 DIAGNOSIS — R92.8 ABNORMAL MAMMOGRAM OF RIGHT BREAST: Primary | ICD-10-CM

## 2023-10-11 ENCOUNTER — HOSPITAL ENCOUNTER (OUTPATIENT)
Facility: HOSPITAL | Age: 68
Discharge: HOME | End: 2023-10-14
Payer: MEDICARE

## 2023-10-11 VITALS — BODY MASS INDEX: 17.68 KG/M2 | HEIGHT: 66 IN | WEIGHT: 110 LBS

## 2023-10-11 DIAGNOSIS — R92.8 ABNORMAL MAMMOGRAM OF RIGHT BREAST: ICD-10-CM

## 2023-10-11 PROCEDURE — 77065 DX MAMMO INCL CAD UNI: CPT

## 2023-12-04 ENCOUNTER — ANCILLARY PROCEDURE (OUTPATIENT)
Age: 68
End: 2023-12-04
Payer: MEDICARE

## 2023-12-04 ENCOUNTER — OFFICE VISIT (OUTPATIENT)
Age: 68
End: 2023-12-04
Payer: MEDICARE

## 2023-12-04 VITALS
DIASTOLIC BLOOD PRESSURE: 70 MMHG | HEART RATE: 76 BPM | BODY MASS INDEX: 17.52 KG/M2 | WEIGHT: 109 LBS | HEIGHT: 66 IN | SYSTOLIC BLOOD PRESSURE: 140 MMHG | OXYGEN SATURATION: 98 %

## 2023-12-04 DIAGNOSIS — Z72.0 TOBACCO ABUSE: ICD-10-CM

## 2023-12-04 DIAGNOSIS — R93.1 AGATSTON CORONARY ARTERY CALCIUM SCORE GREATER THAN 400: ICD-10-CM

## 2023-12-04 DIAGNOSIS — I65.23 OCCLUSION AND STENOSIS OF BILATERAL CAROTID ARTERIES: ICD-10-CM

## 2023-12-04 DIAGNOSIS — E78.2 MIXED HYPERLIPIDEMIA: Primary | ICD-10-CM

## 2023-12-04 LAB
ECHO BSA: 1.52 M2
VAS LEFT CCA DIST EDV: 19.3 CM/S
VAS LEFT CCA DIST PSV: 85.2 CM/S
VAS LEFT CCA PROX EDV: 25.9 CM/S
VAS LEFT CCA PROX PSV: 106.1 CM/S
VAS LEFT ECA EDV: 19.3 CM/S
VAS LEFT ECA PSV: 105 CM/S
VAS LEFT ICA DIST EDV: 24.1 CM/S
VAS LEFT ICA DIST PSV: 79.3 CM/S
VAS LEFT ICA MID EDV: 28.5 CM/S
VAS LEFT ICA MID PSV: 101.6 CM/S
VAS LEFT ICA PROX EDV: 40.4 CM/S
VAS LEFT ICA PROX PSV: 149.2 CM/S
VAS LEFT ICA/CCA PSV: 1.8 NO UNITS
VAS LEFT VERTEBRAL EDV: 17.9 CM/S
VAS LEFT VERTEBRAL PSV: 64.6 CM/S
VAS RIGHT CCA DIST EDV: 27.3 CM/S
VAS RIGHT CCA DIST PSV: 79.4 CM/S
VAS RIGHT CCA PROX EDV: 23.3 CM/S
VAS RIGHT CCA PROX PSV: 76.8 CM/S
VAS RIGHT ECA EDV: 27.2 CM/S
VAS RIGHT ECA PSV: 136.8 CM/S
VAS RIGHT ICA DIST EDV: 30 CM/S
VAS RIGHT ICA DIST PSV: 97.3 CM/S
VAS RIGHT ICA MID EDV: 38.8 CM/S
VAS RIGHT ICA MID PSV: 129 CM/S
VAS RIGHT ICA PROX EDV: 43 CM/S
VAS RIGHT ICA PROX PSV: 175.1 CM/S
VAS RIGHT ICA/CCA PSV: 2.2 NO UNITS
VAS RIGHT VERTEBRAL EDV: 10.5 CM/S
VAS RIGHT VERTEBRAL PSV: 47.9 CM/S

## 2023-12-04 PROCEDURE — G8419 CALC BMI OUT NRM PARAM NOF/U: HCPCS | Performed by: SPECIALIST

## 2023-12-04 PROCEDURE — 4004F PT TOBACCO SCREEN RCVD TLK: CPT | Performed by: SPECIALIST

## 2023-12-04 PROCEDURE — 3017F COLORECTAL CA SCREEN DOC REV: CPT | Performed by: SPECIALIST

## 2023-12-04 PROCEDURE — 1090F PRES/ABSN URINE INCON ASSESS: CPT | Performed by: SPECIALIST

## 2023-12-04 PROCEDURE — 99213 OFFICE O/P EST LOW 20 MIN: CPT | Performed by: SPECIALIST

## 2023-12-04 PROCEDURE — G8400 PT W/DXA NO RESULTS DOC: HCPCS | Performed by: SPECIALIST

## 2023-12-04 PROCEDURE — 1123F ACP DISCUSS/DSCN MKR DOCD: CPT | Performed by: SPECIALIST

## 2023-12-04 PROCEDURE — G8427 DOCREV CUR MEDS BY ELIG CLIN: HCPCS | Performed by: SPECIALIST

## 2023-12-04 PROCEDURE — 93880 EXTRACRANIAL BILAT STUDY: CPT | Performed by: SPECIALIST

## 2023-12-04 PROCEDURE — G8484 FLU IMMUNIZE NO ADMIN: HCPCS | Performed by: SPECIALIST

## 2023-12-04 RX ORDER — FOLIC ACID 1 MG/1
1000 TABLET ORAL
COMMUNITY
Start: 2023-10-18

## 2023-12-04 ASSESSMENT — PATIENT HEALTH QUESTIONNAIRE - PHQ9
1. LITTLE INTEREST OR PLEASURE IN DOING THINGS: 0
SUM OF ALL RESPONSES TO PHQ QUESTIONS 1-9: 0
2. FEELING DOWN, DEPRESSED OR HOPELESS: 0
SUM OF ALL RESPONSES TO PHQ QUESTIONS 1-9: 0
SUM OF ALL RESPONSES TO PHQ9 QUESTIONS 1 & 2: 0

## 2024-09-19 ENCOUNTER — TRANSCRIBE ORDERS (OUTPATIENT)
Facility: HOSPITAL | Age: 69
End: 2024-09-19

## 2024-09-19 DIAGNOSIS — Z12.31 ENCOUNTER FOR SCREENING MAMMOGRAM FOR MALIGNANT NEOPLASM OF BREAST: Primary | ICD-10-CM

## 2024-11-11 ENCOUNTER — HOSPITAL ENCOUNTER (OUTPATIENT)
Facility: HOSPITAL | Age: 69
Discharge: HOME | End: 2024-11-14
Attending: FAMILY MEDICINE
Payer: MEDICARE

## 2024-11-11 VITALS — BODY MASS INDEX: 17.68 KG/M2 | WEIGHT: 110 LBS | HEIGHT: 66 IN

## 2024-11-11 DIAGNOSIS — Z12.31 ENCOUNTER FOR SCREENING MAMMOGRAM FOR MALIGNANT NEOPLASM OF BREAST: ICD-10-CM

## 2024-11-11 PROCEDURE — 77063 BREAST TOMOSYNTHESIS BI: CPT

## 2024-12-16 ENCOUNTER — ANCILLARY PROCEDURE (OUTPATIENT)
Age: 69
End: 2024-12-16
Payer: MEDICARE

## 2024-12-16 ENCOUNTER — OFFICE VISIT (OUTPATIENT)
Age: 69
End: 2024-12-16
Payer: MEDICARE

## 2024-12-16 VITALS
SYSTOLIC BLOOD PRESSURE: 138 MMHG | HEART RATE: 75 BPM | OXYGEN SATURATION: 97 % | WEIGHT: 110 LBS | DIASTOLIC BLOOD PRESSURE: 70 MMHG | BODY MASS INDEX: 17.75 KG/M2

## 2024-12-16 DIAGNOSIS — E78.2 MIXED HYPERLIPIDEMIA: Primary | ICD-10-CM

## 2024-12-16 DIAGNOSIS — I65.23 OCCLUSION AND STENOSIS OF BILATERAL CAROTID ARTERIES: ICD-10-CM

## 2024-12-16 DIAGNOSIS — R93.1 AGATSTON CORONARY ARTERY CALCIUM SCORE GREATER THAN 400: ICD-10-CM

## 2024-12-16 DIAGNOSIS — Z72.0 TOBACCO ABUSE: ICD-10-CM

## 2024-12-16 LAB
VAS LEFT CCA DIST EDV: 23.2 CM/S
VAS LEFT CCA DIST PSV: 92 CM/S
VAS LEFT CCA PROX EDV: 26.9 CM/S
VAS LEFT CCA PROX PSV: 104.3 CM/S
VAS LEFT ECA EDV: 24.4 CM/S
VAS LEFT ECA PSV: 109.1 CM/S
VAS LEFT ICA DIST EDV: 30.5 CM/S
VAS LEFT ICA DIST PSV: 96.3 CM/S
VAS LEFT ICA MID EDV: 30.5 CM/S
VAS LEFT ICA MID PSV: 122.7 CM/S
VAS LEFT ICA PROX EDV: 51.6 CM/S
VAS LEFT ICA PROX PSV: 181.2 CM/S
VAS LEFT ICA/CCA PSV: 2 NO UNITS
VAS LEFT VERTEBRAL EDV: 17.5 CM/S
VAS LEFT VERTEBRAL PSV: 59.2 CM/S
VAS RIGHT CCA DIST EDV: 24.6 CM/S
VAS RIGHT CCA DIST PSV: 84.6 CM/S
VAS RIGHT CCA PROX EDV: 22 CM/S
VAS RIGHT CCA PROX PSV: 67.7 CM/S
VAS RIGHT ECA EDV: 28.2 CM/S
VAS RIGHT ECA PSV: 144.8 CM/S
VAS RIGHT ICA DIST EDV: 22 CM/S
VAS RIGHT ICA DIST PSV: 69.9 CM/S
VAS RIGHT ICA MID EDV: 30.8 CM/S
VAS RIGHT ICA MID PSV: 149.8 CM/S
VAS RIGHT ICA PROX EDV: 51.5 CM/S
VAS RIGHT ICA PROX PSV: 199 CM/S
VAS RIGHT ICA/CCA PSV: 2.4 NO UNITS
VAS RIGHT VERTEBRAL EDV: 8.4 CM/S
VAS RIGHT VERTEBRAL PSV: 51.9 CM/S

## 2024-12-16 PROCEDURE — 1159F MED LIST DOCD IN RCRD: CPT | Performed by: SPECIALIST

## 2024-12-16 PROCEDURE — 99214 OFFICE O/P EST MOD 30 MIN: CPT | Performed by: SPECIALIST

## 2024-12-16 PROCEDURE — 4004F PT TOBACCO SCREEN RCVD TLK: CPT | Performed by: SPECIALIST

## 2024-12-16 PROCEDURE — 1126F AMNT PAIN NOTED NONE PRSNT: CPT | Performed by: SPECIALIST

## 2024-12-16 PROCEDURE — 1090F PRES/ABSN URINE INCON ASSESS: CPT | Performed by: SPECIALIST

## 2024-12-16 PROCEDURE — G8419 CALC BMI OUT NRM PARAM NOF/U: HCPCS | Performed by: SPECIALIST

## 2024-12-16 PROCEDURE — G8400 PT W/DXA NO RESULTS DOC: HCPCS | Performed by: SPECIALIST

## 2024-12-16 PROCEDURE — 1123F ACP DISCUSS/DSCN MKR DOCD: CPT | Performed by: SPECIALIST

## 2024-12-16 PROCEDURE — G8484 FLU IMMUNIZE NO ADMIN: HCPCS | Performed by: SPECIALIST

## 2024-12-16 PROCEDURE — 93880 EXTRACRANIAL BILAT STUDY: CPT | Performed by: SPECIALIST

## 2024-12-16 PROCEDURE — 3017F COLORECTAL CA SCREEN DOC REV: CPT | Performed by: SPECIALIST

## 2024-12-16 PROCEDURE — 1160F RVW MEDS BY RX/DR IN RCRD: CPT | Performed by: SPECIALIST

## 2024-12-16 PROCEDURE — G8427 DOCREV CUR MEDS BY ELIG CLIN: HCPCS | Performed by: SPECIALIST

## 2024-12-16 RX ORDER — POLYVINYL ALCOHOL 14 MG/ML
SOLUTION/ DROPS OPHTHALMIC
COMMUNITY

## 2024-12-16 ASSESSMENT — PATIENT HEALTH QUESTIONNAIRE - PHQ9
SUM OF ALL RESPONSES TO PHQ QUESTIONS 1-9: 0
SUM OF ALL RESPONSES TO PHQ9 QUESTIONS 1 & 2: 0
SUM OF ALL RESPONSES TO PHQ QUESTIONS 1-9: 0
2. FEELING DOWN, DEPRESSED OR HOPELESS: NOT AT ALL
1. LITTLE INTEREST OR PLEASURE IN DOING THINGS: NOT AT ALL
SUM OF ALL RESPONSES TO PHQ QUESTIONS 1-9: 0
SUM OF ALL RESPONSES TO PHQ QUESTIONS 1-9: 0

## 2024-12-16 NOTE — PROGRESS NOTES
Faxed records request for lab results to pt's PCP.    Lab results received and were given to Dr. Moe to review.  
other interpretive errors are inadvertently transcribed by the computer software.  Please disregard these errors.  Please excuse any errors that have escaped final proofreading.  Thank you.

## 2025-03-19 ENCOUNTER — CLINICAL DOCUMENTATION (OUTPATIENT)
Age: 70
End: 2025-03-19

## 2025-03-19 NOTE — PROGRESS NOTES
3/19/25 11:07am: Records received from Reji Simmons MD at [facility]. Dx:Cirrhosis without ascites. Routine appt. Records placed in Mari's box

## (undated) DEVICE — REM POLYHESIVE ADULT PATIENT RETURN ELECTRODE: Brand: VALLEYLAB

## (undated) DEVICE — SNARE ENDOSCP M L240CM W27MM SHTH DIA2.4MM CHN 2.8MM OVL